# Patient Record
Sex: MALE | Race: WHITE | NOT HISPANIC OR LATINO | URBAN - METROPOLITAN AREA
[De-identification: names, ages, dates, MRNs, and addresses within clinical notes are randomized per-mention and may not be internally consistent; named-entity substitution may affect disease eponyms.]

---

## 2018-05-23 ENCOUNTER — OFFICE VISIT (OUTPATIENT)
Dept: ORTHOPEDIC SURGERY | Facility: CLINIC | Age: 23
End: 2018-05-23

## 2018-05-23 VITALS
HEART RATE: 94 BPM | BODY MASS INDEX: 28.5 KG/M2 | HEIGHT: 68 IN | DIASTOLIC BLOOD PRESSURE: 107 MMHG | SYSTOLIC BLOOD PRESSURE: 172 MMHG | WEIGHT: 188.05 LBS

## 2018-05-23 DIAGNOSIS — M84.361A STRESS FRACTURE OF RIGHT TIBIA, INITIAL ENCOUNTER: Primary | ICD-10-CM

## 2018-05-23 PROCEDURE — 99204 OFFICE O/P NEW MOD 45 MIN: CPT | Performed by: ORTHOPAEDIC SURGERY

## 2018-05-23 NOTE — PROGRESS NOTES
[facility]    Subjective     Chief Complaint   Patient presents with   • Right Lower Leg - Pain        HPI      Faisal Dougherty is a 22 y.o. male.  He complains of right leg pain.  He started running to train for PT test and started to have increasing right tibia pain.  His pain is 7 out of 10 and throbbing.  He is currently on crutches.  He was told he has a stress fracture at [facility] .        History reviewed. No pertinent past medical history.   Past Surgical History:   Procedure Laterality Date   • HERNIA REPAIR     • NASAL SEPTUM SURGERY        Family History   Problem Relation Age of Onset   • No Known Problems Mother    • No Known Problems Father      Social History     Social History   • Marital status: Single     Spouse name: N/A   • Number of children: N/A   • Years of education: N/A     Occupational History   • Not on file.     Social History Main Topics   • Smoking status: Former Smoker   • Smokeless tobacco: Never Used   • Alcohol use No   • Drug use: No   • Sexual activity: Defer     Other Topics Concern   • Not on file     Social History Narrative   • No narrative on file      No current outpatient prescriptions on file prior to visit.     No current facility-administered medications on file prior to visit.       No Known Allergies     The following portions of the patient's history were reviewed and updated as appropriate: allergies, current medications, past family history, past medical history, past social history, past surgical history and problem list.    Review of Systems   Constitutional: Negative.    HENT: Negative.    Eyes: Negative.    Respiratory: Negative.    Cardiovascular: Negative.    Gastrointestinal: Negative.    Endocrine: Negative.    Genitourinary: Negative.    Musculoskeletal: Positive for arthralgias.   Skin: Negative.    Allergic/Immunologic: Negative.    Neurological: Negative.    Hematological: Negative.    Psychiatric/Behavioral: Negative.      "    Objective      Physical Exam  BP (!) 172/107   Pulse 94   Ht 172.7 cm (67.99\")   Wt 85.3 kg (188 lb 0.8 oz)   BMI 28.60 kg/m²     Body mass index is 28.6 kg/m².        GENERAL APPEARANCE: awake, alert & oriented x 3, in no acute distress and well developed, well nourished  PSYCH: normal mood and affect  LUNGS:  breathing nonlabored, no wheezing  EYES: sclera anicteric, pupils equal  CARDIOVASCULAR: palpable pulses dorsalis pedis, palpable posterior tibial bilaterally. Capillary refill less than 2 seconds  INTEGUMENTARY: skin intact, no clubbing, cyanosis  NEUROLOGIC:  Normal Sensation and reflexes             Ortho Exam  Peripheral Vascular:    Upper Extremity:   Inspection:  Left--no cyanotic nail beds Right--no cyanotic nail beds   Bilateral:  Pink nail beds with brisk capillary refill   Palpation:  Bilateral radial pulse normal  Musculoskeletal:  Global Assessment:  Overall assessment of Lower Extremity Muscle Strength and Tone:  Right quadriceps--5/5  Right hamstrings--5/5  Right tibialis anterior--5/5  Right gastroc soleus--5/5  Right EHL--5/5  Lower Extremity:  Knee/Patella:  No digital clubbing or cyanosis.    Examination of right knee reveals:  Normal deep tendon reflexes, coordination, strength, tone, sensation.  No known fractures or deformities.  Inspection and Palpation:    Right knee:  Tenderness:  Mid tibia shaft tenderness with minimal swelling  Effusion:  none  Crepitus:  none  Pulses:  2+  Ecchymosis:  None  Warmth:  None   ROM:  Right:  Extension:0    Flexion:135  Left:  Extension:0     Flexion:135  Instability:  Right:  Lachman Test:  Negative, Varus stress test negative,   Valgus stress test negative, Posterior Drawer Test:  Negative  Deformities/Malalignments/Discrepancies:    Left:  none  Right:  none  Functional Testing:  Right:  Antelmo's test:  Negative  Patella grind test:  Negative  Q-angle:  Normal  Apprehension Sign:  Negative        Imaging/Studies  Imaging Results (last 7 " days)     ** No results found for the last 168 hours. **        I reviewed the x-rays which show no definitive black line  Assessment/Plan        ICD-10-CM ICD-9-CM   1. Stress fracture of right tibia, initial encounter M84.361A 733.93       Orders Placed This Encounter   Procedures   • MRI Tibia Fibula Right Without Contrast    I'll see him back after the MRI right tibia.  He most likely has a stress fracture.  He Will remain on crutches nonweightbearing.      Medical Decision Making  Management Options : over-the-counter medicine and close treatment of fracture or dislocation  Data/Risk: radiology tests and independent visualization of imaging, lab tests, or EMG/NCV    Mainor Navarrete MD  05/23/18  10:07 AM         EMR Dragon/Transcription disclaimer:  Much of this encounter note is an electronic transcription of spoken language to printed text. Electronic transcription of spoken language may permit erroneous, or at times, nonsensical words or phrases to be inadvertently transcribed. Although I have reviewed the note for such errors, some may still exist.

## 2018-06-04 ENCOUNTER — HOSPITAL ENCOUNTER (OUTPATIENT)
Dept: MRI IMAGING | Facility: HOSPITAL | Age: 23
Discharge: HOME | End: 2018-06-04
Attending: ORTHOPAEDIC SURGERY | Admitting: ORTHOPAEDIC SURGERY

## 2018-06-04 DIAGNOSIS — M84.361A STRESS FRACTURE OF RIGHT TIBIA, INITIAL ENCOUNTER: ICD-10-CM

## 2018-06-04 PROCEDURE — 73718 MRI LOWER EXTREMITY W/O DYE: CPT

## 2018-06-06 ENCOUNTER — TELEPHONE (OUTPATIENT)
Dept: ORTHOPEDIC SURGERY | Facility: CLINIC | Age: 23
End: 2018-06-06

## 2018-06-06 ENCOUNTER — OFFICE VISIT (OUTPATIENT)
Dept: ORTHOPEDIC SURGERY | Facility: CLINIC | Age: 23
End: 2018-06-06

## 2018-06-06 VITALS — BODY MASS INDEX: 28.5 KG/M2 | HEIGHT: 68 IN | OXYGEN SATURATION: 98 % | WEIGHT: 188.05 LBS | HEART RATE: 70 BPM

## 2018-06-06 DIAGNOSIS — M84.361G STRESS FRACTURE OF RIGHT TIBIA WITH DELAYED HEALING, SUBSEQUENT ENCOUNTER: Primary | ICD-10-CM

## 2018-06-06 PROCEDURE — 99213 OFFICE O/P EST LOW 20 MIN: CPT | Performed by: ORTHOPAEDIC SURGERY

## 2018-06-06 NOTE — PROGRESS NOTES
"    [facility] [facility]    Subjective     Chief Complaint   Patient presents with   • Right Tibia - Follow-up     After MRI 06/04/2018        HPI      Faisal Dougherty is a 22 y.o. male.  He is follow-up MRI of the right tibia to rule out stress fracture.  His pain is the same.  It is 8 out of 10.  It is throbbing and shooting.        History reviewed. No pertinent past medical history.   Past Surgical History:   Procedure Laterality Date   • HERNIA REPAIR     • NASAL SEPTUM SURGERY        Family History   Problem Relation Age of Onset   • No Known Problems Mother    • No Known Problems Father      Social History     Social History   • Marital status: Single     Spouse name: N/A   • Number of children: N/A   • Years of education: N/A     Occupational History   • Not on file.     Social History Main Topics   • Smoking status: Former Smoker   • Smokeless tobacco: Never Used   • Alcohol use No   • Drug use: No   • Sexual activity: Defer     Other Topics Concern   • Not on file     Social History Narrative   • No narrative on file      No current outpatient prescriptions on file prior to visit.     No current facility-administered medications on file prior to visit.       No Known Allergies     The following portions of the patient's history were reviewed and updated as appropriate: allergies, current medications, past family history, past medical history, past social history, past surgical history and problem list.    Review of Systems   Constitutional: Negative.    HENT: Negative.    Eyes: Negative.    Respiratory: Negative.    Cardiovascular: Negative.    Gastrointestinal: Negative.    Endocrine: Negative.    Genitourinary: Negative.    Musculoskeletal: Positive for arthralgias.   Skin: Negative.    Allergic/Immunologic: Negative.    Neurological: Negative.    Hematological: Negative.    Psychiatric/Behavioral: Negative.         Objective      Physical Exam  Pulse 70   Ht 172.7 cm (67.99\")   Wt 85.3 " kg (188 lb 0.8 oz)   SpO2 98%   BMI 28.60 kg/m²     Body mass index is 28.6 kg/m².        GENERAL APPEARANCE: awake, alert & oriented x 3, in no acute distress and well developed, well nourished  PSYCH: normal mood and affect    Ortho Exam  Tender right tibia shaft.  With full motion and normal strength.    Imaging/Studies  Imaging Results (last 7 days)     ** No results found for the last 168 hours. **      I reviewed the MRI which shows a right tibia stress fracture    Assessment/Plan        ICD-10-CM ICD-9-CM   1. Stress fracture of right tibia with delayed healing, subsequent encounter M84.361G V54.89       The plan will be crutches for a minimum of 6 weeks.  He has been on crutches for 3 weeks.  He will follow-up in 3 weeks.  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    Medical Decision Making  Management Options : over-the-counter medicine and close treatment of fracture or dislocation  Data/Risk: radiology tests and independent visualization of imaging, lab tests, or EMG/NCV    Mainor Navarrete MD  06/06/18  10:43 AM         EMR Dragon/Transcription disclaimer:  Much of this encounter note is an electronic transcription of spoken language to printed text. Electronic transcription of spoken language may permit erroneous, or at times, nonsensical words or phrases to be inadvertently transcribed. Although I have reviewed the note for such errors, some may still exist.

## 2018-06-06 NOTE — TELEPHONE ENCOUNTER
Foreign is this something you take care of?        ----- Message from Faisal Dougherty sent at 6/6/2018 12:12 PM EDT -----  Regarding: Test Results Question  Contact: [phone]  Can you attach test results and state the injury for the ? I am in [employer] and they need detailed information on what’s going on

## 2018-06-07 NOTE — TELEPHONE ENCOUNTER
I don't do anything with test results.  Sounds like he needs to have some kind of Disability paperwork filled out, in which case I could fill those out and have Dr. Navarrete sign them.  If he is just wanting an office visit notes and test results then he will need to fill out a medical records release and the  would take care of getting those sent to him.

## 2018-06-07 NOTE — TELEPHONE ENCOUNTER
Spoke to patient and he stated he did not have any form he needed completed. I informed him to come in and sign a medical release form to get test results and office notes for the . He seem to understand this.

## 2018-06-27 ENCOUNTER — OFFICE VISIT (OUTPATIENT)
Dept: ORTHOPEDIC SURGERY | Facility: CLINIC | Age: 23
End: 2018-06-27

## 2018-06-27 VITALS — WEIGHT: 182.1 LBS | BODY MASS INDEX: 27.6 KG/M2 | HEART RATE: 96 BPM | OXYGEN SATURATION: 98 % | HEIGHT: 68 IN

## 2018-06-27 DIAGNOSIS — M84.361G STRESS FRACTURE OF RIGHT TIBIA WITH DELAYED HEALING, SUBSEQUENT ENCOUNTER: Primary | ICD-10-CM

## 2018-06-27 PROCEDURE — 99212 OFFICE O/P EST SF 10 MIN: CPT | Performed by: ORTHOPAEDIC SURGERY

## 2018-06-27 NOTE — PROGRESS NOTES
[facility] Note    Subjective     Chief Complaint   Patient presents with   • Follow-up     3 week follow up -- Stress fracture of right tibia with delayed healing        HPI      Faisal Dougherty is a 22 y.o. male.  He is follow-up right tibia stress fracture.  He's had this for 8 weeks.  He's been on crutches for 6 weeks.  He feels better.  But after walks of more than a short distance he starts to have pain.  He was not supposed be walking any way.         History reviewed. No pertinent past medical history.   Past Surgical History:   Procedure Laterality Date   • HERNIA REPAIR     • NASAL SEPTUM SURGERY        Family History   Problem Relation Age of Onset   • No Known Problems Mother    • No Known Problems Father      Social History     Social History   • Marital status: Single     Spouse name: N/A   • Number of children: N/A   • Years of education: N/A     Occupational History   • Not on file.     Social History Main Topics   • Smoking status: Former Smoker   • Smokeless tobacco: Never Used   • Alcohol use No   • Drug use: No   • Sexual activity: Defer     Other Topics Concern   • Not on file     Social History Narrative   • No narrative on file      No current outpatient prescriptions on file prior to visit.     No current facility-administered medications on file prior to visit.       No Known Allergies     The following portions of the patient's history were reviewed and updated as appropriate: allergies, current medications, past family history, past medical history, past social history, past surgical history and problem list.    Review of Systems   Constitutional: Negative.    HENT: Negative.    Eyes: Negative.    Respiratory: Negative.    Cardiovascular: Negative.    Gastrointestinal: Negative.    Endocrine: Negative.    Genitourinary: Negative.    Musculoskeletal: Positive for arthralgias.   Skin: Negative.    Allergic/Immunologic: Negative.    Neurological: Negative.   "  Hematological: Negative.    Psychiatric/Behavioral: Negative.         Objective      Physical Exam  Pulse 96   Ht 172.7 cm (67.99\")   Wt 82.6 kg (182 lb 1.6 oz)   SpO2 98%   BMI 27.69 kg/m²     Body mass index is 27.69 kg/m².        GENERAL APPEARANCE: awake, alert & oriented x 3, in no acute distress and well developed, well nourished  PSYCH: normal mood and affect      Ortho Exam  He has minimal right tibia tenderness.  He is neurovascular intact with full motion.      Assessment/Plan        ICD-10-CM ICD-9-CM   1. Stress fracture of right tibia with delayed healing, subsequent encounter M84.361G V54.89     He understands the fracture is not completely healed yet.  Pain is how we can  clinical healing.  He has to work or he will lose his job.  We will get him a long-leg Aircast.  He may return to work full duty July 2.  He is at increased risk of fracture.  He understands this but he feels he has no option.  He will follow-up in 3 weeks.  Medical Decision Making  Management Options : over-the-counter medicine and close treatment of fracture or dislocation      Mainor Navarrete MD  06/27/18  11:47 AM         EMR Dragon/Transcription disclaimer:  Much of this encounter note is an electronic transcription of spoken language to printed text. Electronic transcription of spoken language may permit erroneous, or at times, nonsensical words or phrases to be inadvertently transcribed. Although I have reviewed the note for such errors, some may still exist.      "

## 2018-06-28 ENCOUNTER — TELEPHONE (OUTPATIENT)
Dept: ORTHOPEDIC SURGERY | Facility: CLINIC | Age: 23
End: 2018-06-28

## 2018-06-28 NOTE — TELEPHONE ENCOUNTER
----- Message from Faisal Rodriguezgayle sent at 6/28/2018 12:04 PM EDT -----  Regarding: Visit Follow-Up Question  Contact: [phone]    Could you type up A note for work, stating I may return to work with NO RISTRICTIONS please. There picky about the paper work and the other note wasn’t good enough. Thank you

## 2018-06-29 NOTE — TELEPHONE ENCOUNTER
Can you please type up a Return to work note for this patient.  He can return with No Restrictions.  [place]

## 2018-07-18 ENCOUNTER — OFFICE VISIT (OUTPATIENT)
Dept: ORTHOPEDIC SURGERY | Facility: CLINIC | Age: 23
End: 2018-07-18

## 2018-07-18 VITALS — HEIGHT: 68 IN | WEIGHT: 182.1 LBS | HEART RATE: 83 BPM | BODY MASS INDEX: 27.6 KG/M2 | OXYGEN SATURATION: 99 %

## 2018-07-18 DIAGNOSIS — M84.361G STRESS FRACTURE OF RIGHT TIBIA WITH DELAYED HEALING, SUBSEQUENT ENCOUNTER: Primary | ICD-10-CM

## 2018-07-18 PROCEDURE — 99213 OFFICE O/P EST LOW 20 MIN: CPT | Performed by: ORTHOPAEDIC SURGERY

## 2018-07-18 NOTE — PROGRESS NOTES
"    [facility]    Subjective     CC: Follow-up of the Right Tibia (3 weeks)      HPI    Faisal Dougherty is a 22 y.o. male. He is follow-up right tibia stress fracture from 2 weeks ago.  He is doing better.  His pain is 3 out of 10 and shooting.  He is in an Aircast.      ROS:    Constiutional:Pt denies fever, chills, nausea, or vomiting.  MSK:as above    Objective      Past Medical History  No past medical history on file.      Physical Exam  Pulse 83   Ht 172.7 cm (67.99\")   Wt 82.6 kg (182 lb 1.6 oz)   SpO2 99%   BMI 27.69 kg/m²     Body mass index is 27.69 kg/m².    Patient is well nourished and well developed.        Ortho Exam  Peripheral Vascular:    Upper Extremity:   Inspection:  Left--no cyanotic nail beds Right--no cyanotic nail beds   Bilateral:  Pink nail beds with brisk capillary refill   Palpation:  Bilateral radial pulse normal  Musculoskeletal:  Global Assessment:  Overall assessment of Lower Extremity Muscle Strength and Tone:  Right quadriceps--5/5  Right hamstrings--5/5  Right tibialis anterior--5/5  Right gastroc soleus--5/5  Right EHL--5/5  Lower Extremity:  Knee/Patella:  No digital clubbing or cyanosis.    Examination of right knee reveals:  Normal deep tendon reflexes, coordination, strength, tone, sensation.  No known fractures or deformities.  Inspection and Palpation:    Right knee:  Tenderness:  none  Effusion:  none  Crepitus:  none  Pulses:  2+  Ecchymosis:  None  Warmth:  None   ROM:  Right:  Extension:0    Flexion:135  Left:  Extension:0     Flexion:135  Instability:  Right:  Lachman Test:  Negative, Varus stress test negative,   Valgus stress test negative, Posterior Drawer Test:  Negative  Deformities/Malalignments/Discrepancies:    Left:  none  Right:  none  Functional Testing:  Right:  Antelmo's test:  Negative  Patella grind test:  Negative  Q-angle:  Normal  Apprehension Sign:  Negative        Imaging/Labs/EMG Reviewed:  Imaging Results (last 24 " hours)     Procedure Component Value Units Date/Time    XR Tibia Fibula 2 View Right [[id number]] Resulted:  07/18/18 1111     Updated:  07/18/18 1111    Narrative:       Right Tib-Fib X-Ray  Indication: Pain  AP and Lateral views    Findings:  No visible fracture  No bony lesion  Normal soft tissues  Normal joint spaces    He had a tibia stress fracture on previous MRI.              Assessment:  1. Stress fracture of right tibia with delayed healing, subsequent encounter        Plan:  1. Recommend over the counter anti-inflammatories for pain and/or swelling  2. His restriction is no running.  He will follow-up in one month.  He will not need any more x-rays unless he feels worse.  He is clinically doing better.      Medical Decision Making  Management Options : over-the-counter medicine  Data/Risk: radiology tests and independent visualization of imaging, lab tests, or EMG/NCV    Mainor Navarrete MD  07/18/18  11:12 AM

## 2018-07-24 ENCOUNTER — TELEPHONE (OUTPATIENT)
Dept: ORTHOPEDIC SURGERY | Facility: CLINIC | Age: 23
End: 2018-07-24

## 2018-07-24 NOTE — TELEPHONE ENCOUNTER
----- Message from Faisla Dougherty sent at 7/24/2018  2:16 PM EDT -----  Regarding: Visit Follow-Up Question  Contact: [phone]  The  needs doctors notes, for June and July visits. Also need your judgement to see if I need to attend annual  training coming up. If possible send via email or on here   [email or web address]  Thanks

## 2018-07-25 NOTE — TELEPHONE ENCOUNTER
Informed patient he will need to stop by the office to sign a release form to get all his office notes. He understood and will come by next week.

## 2018-08-15 ENCOUNTER — OFFICE VISIT (OUTPATIENT)
Dept: ORTHOPEDIC SURGERY | Facility: CLINIC | Age: 23
End: 2018-08-15

## 2018-08-15 VITALS — WEIGHT: 179.9 LBS | OXYGEN SATURATION: 99 % | BODY MASS INDEX: 27.26 KG/M2 | HEIGHT: 68 IN | HEART RATE: 101 BPM

## 2018-08-15 DIAGNOSIS — M84.361G STRESS FRACTURE OF RIGHT TIBIA WITH DELAYED HEALING, SUBSEQUENT ENCOUNTER: Primary | ICD-10-CM

## 2018-08-15 PROCEDURE — 99212 OFFICE O/P EST SF 10 MIN: CPT | Performed by: ORTHOPAEDIC SURGERY

## 2018-08-15 RX ORDER — PRAZOSIN HYDROCHLORIDE 1 MG/1
CAPSULE ORAL
COMMUNITY
Start: 2018-08-14 | End: 2022-05-02

## 2018-08-15 NOTE — PROGRESS NOTES
"    [facility] [place] [facility] Note    Subjective     Chief Complaint   Patient presents with   • Right Tibia - Follow-up     4 weeks        HPI      Faisal Dougherty is a 23 y.o. male.  He is follow-up right tibia stress fracture.  He's been treated for 12 weeks.  He's doing better.  No pain.        History reviewed. No pertinent past medical history.   Past Surgical History:   Procedure Laterality Date   • HERNIA REPAIR     • NASAL SEPTUM SURGERY        Family History   Problem Relation Age of Onset   • No Known Problems Mother    • No Known Problems Father      Social History     Social History   • Marital status: Single     Spouse name: N/A   • Number of children: N/A   • Years of education: N/A     Occupational History   • Not on file.     Social History Main Topics   • Smoking status: Former Smoker   • Smokeless tobacco: Never Used   • Alcohol use No   • Drug use: No   • Sexual activity: Defer     Other Topics Concern   • Not on file     Social History Narrative   • No narrative on file      No current outpatient prescriptions on file prior to visit.     No current facility-administered medications on file prior to visit.       No Known Allergies     The following portions of the patient's history were reviewed and updated as appropriate: allergies, current medications, past family history, past medical history, past social history, past surgical history and problem list.    Review of Systems   Constitutional: Negative.    HENT: Negative.    Eyes: Negative.    Respiratory: Negative.    Cardiovascular: Negative.    Gastrointestinal: Negative.    Endocrine: Negative.    Genitourinary: Negative.    Musculoskeletal: Positive for arthralgias.   Skin: Negative.    Allergic/Immunologic: Negative.    Neurological: Negative.    Hematological: Negative.    Psychiatric/Behavioral: Negative.         Objective      Physical Exam  Pulse 101   Ht 172.7 cm (67.99\")   Wt 81.6 kg (179 lb 14.3 oz)   SpO2 99%   BMI 27.36 " kg/m²     Body mass index is 27.36 kg/m².        GENERAL APPEARANCE: awake, alert & oriented x 3, in no acute distress and well developed, well nourished  PSYCH: normal mood and affect      Ortho Exam  Peripheral Vascular:    Upper Extremity:   Inspection:  Left--no cyanotic nail beds Right--no cyanotic nail beds   Bilateral:  Pink nail beds with brisk capillary refill   Palpation:  Bilateral radial pulse normal  Musculoskeletal:  Global Assessment:  Overall assessment of Lower Extremity Muscle Strength and Tone:  Right quadriceps--5/5  Right hamstrings--5/5  Right tibialis anterior--5/5  Right gastroc soleus--5/5  Right EHL--5/5  Lower Extremity:  Knee/Patella:  No digital clubbing or cyanosis.    Examination of right knee reveals:  Normal deep tendon reflexes, coordination, strength, tone, sensation.  No known fractures or deformities.  Inspection and Palpation:    Right knee:  Tenderness:  none  Effusion:  none  Crepitus:  none  Pulses:  2+  Ecchymosis:  None  Warmth:  None   ROM:  Right:  Extension:0    Flexion:135  Left:  Extension:0     Flexion:135  Instability:  Right:  Lachman Test:  Negative, Varus stress test negative,   Valgus stress test negative, Posterior Drawer Test:  Negative  Deformities/Malalignments/Discrepancies:    Left:  none  Right:  none  Functional Testing:  Right:  Antelmo's test:  Negative  Patella grind test:  Negative  Q-angle:  Normal  Apprehension Sign:  Negative        Imaging/Studies  Imaging Results (last 7 days)     ** No results found for the last 168 hours. **          Assessment/Plan        ICD-10-CM ICD-9-CM   1. Stress fracture of right tibia with delayed healing, subsequent encounter M84.361G V54.89     He may advance activity as tolerated.  No running.  He'll follow-up in 6 weeks.  Medical Decision Making  Management Options : over-the-counter medicine      Mainor Navarrete MD  08/15/18  3:06 PM         EMR Dragon/Transcription disclaimer:  Much of this encounter note is  an electronic transcription of spoken language to printed text. Electronic transcription of spoken language may permit erroneous, or at times, nonsensical words or phrases to be inadvertently transcribed. Although I have reviewed the note for such errors, some may still exist.

## 2018-09-26 ENCOUNTER — OFFICE VISIT (OUTPATIENT)
Dept: ORTHOPEDIC SURGERY | Facility: CLINIC | Age: 23
End: 2018-09-26

## 2018-09-26 VITALS — OXYGEN SATURATION: 98 % | WEIGHT: 176.37 LBS | BODY MASS INDEX: 26.73 KG/M2 | HEIGHT: 68 IN | HEART RATE: 99 BPM

## 2018-09-26 DIAGNOSIS — M84.361G STRESS FRACTURE OF RIGHT TIBIA WITH DELAYED HEALING, SUBSEQUENT ENCOUNTER: Primary | ICD-10-CM

## 2018-09-26 PROCEDURE — 99212 OFFICE O/P EST SF 10 MIN: CPT | Performed by: ORTHOPAEDIC SURGERY

## 2018-09-26 NOTE — PROGRESS NOTES
[facility] [place] [facility] Note    Subjective     Chief Complaint   Patient presents with   • Right Tibia - Follow-up     6 week        HPI      Faisal Dougherty is a 23 y.o. male.  He feels better.  He is 12 weeks out with his right tibia stress fracture.  He is in [employer].  He has not been running.  He has some pain after he is on the bike for an extended amount of time.        History reviewed. No pertinent past medical history.   Past Surgical History:   Procedure Laterality Date   • HERNIA REPAIR     • NASAL SEPTUM SURGERY        Family History   Problem Relation Age of Onset   • No Known Problems Mother    • No Known Problems Father      Social History     Social History   • Marital status: Single     Spouse name: N/A   • Number of children: N/A   • Years of education: N/A     Occupational History   • Not on file.     Social History Main Topics   • Smoking status: Former Smoker   • Smokeless tobacco: Never Used   • Alcohol use No   • Drug use: No   • Sexual activity: Defer     Other Topics Concern   • Not on file     Social History Narrative   • No narrative on file      Current Outpatient Prescriptions on File Prior to Visit   Medication Sig Dispense Refill   • prazosin (MINIPRESS) 1 MG capsule        No current facility-administered medications on file prior to visit.       No Known Allergies     The following portions of the patient's history were reviewed and updated as appropriate: allergies, current medications, past family history, past medical history, past social history, past surgical history and problem list.    Review of Systems   Constitutional: Negative.    HENT: Negative.    Eyes: Negative.    Respiratory: Negative.    Cardiovascular: Negative.    Gastrointestinal: Negative.    Endocrine: Negative.    Genitourinary: Negative.    Musculoskeletal: Positive for arthralgias.   Skin: Negative.    Allergic/Immunologic: Negative.    Neurological: Negative.    Hematological: Negative.   "  Psychiatric/Behavioral: Negative.         Objective      Physical Exam  Pulse 99   Ht 172.7 cm (67.99\")   Wt 80 kg (176 lb 5.9 oz)   SpO2 98%   BMI 26.82 kg/m²     Body mass index is 26.82 kg/m².        GENERAL APPEARANCE: awake, alert & oriented x 3, in no acute distress and well developed, well nourished  PSYCH: normal mood and affect    Ortho Exam  Peripheral Vascular:    Upper Extremity:   Inspection:  Left--no cyanotic nail beds Right--no cyanotic nail beds   Bilateral:  Pink nail beds with brisk capillary refill   Palpation:  Bilateral radial pulse normal  Musculoskeletal:  Global Assessment:  Overall assessment of Lower Extremity Muscle Strength and Tone:  Right quadriceps--5/5  Right hamstrings--5/5  Right tibialis anterior--5/5  Right gastroc soleus--5/5  Right EHL--5/5  Lower Extremity:  Knee/Patella:  No digital clubbing or cyanosis.    Examination of right knee reveals:  Normal deep tendon reflexes, coordination, strength, tone, sensation.  No known fractures or deformities.  Inspection and Palpation:    Right knee:  Tenderness:  none  Effusion:  none  Crepitus:  none  Pulses:  2+  Ecchymosis:  None  Warmth:  None   ROM:  Right:  Extension:0    Flexion:135  Left:  Extension:0     Flexion:135  Instability:  Right:  Lachman Test:  Negative, Varus stress test negative,   Valgus stress test negative, Posterior Drawer Test:  Negative  Deformities/Malalignments/Discrepancies:    Left:  none  Right:  none  Functional Testing:  Right:  Antelmo's test:  Negative  Patella grind test:  Negative  Q-angle:  Normal  Apprehension Sign:  Negative        Imaging/Studies  Imaging Results (last 7 days)     ** No results found for the last 168 hours. **          Assessment/Plan        ICD-10-CM ICD-9-CM   1. Stress fracture of right tibia with delayed healing, subsequent encounter M84.361G V54.89     I recommend no running yet.  I'll see him back in a month.  He may continue the exercise bike.  Medical Decision " Making  Management Options : over-the-counter medicine      Mainor Navarrete MD  09/26/18  3:17 PM         EMR Dragon/Transcription disclaimer:  Much of this encounter note is an electronic transcription of spoken language to printed text. Electronic transcription of spoken language may permit erroneous, or at times, nonsensical words or phrases to be inadvertently transcribed. Although I have reviewed the note for such errors, some may still exist.

## 2018-10-29 ENCOUNTER — OFFICE VISIT (OUTPATIENT)
Dept: ORTHOPEDIC SURGERY | Facility: CLINIC | Age: 23
End: 2018-10-29

## 2018-10-29 VITALS — HEART RATE: 112 BPM | WEIGHT: 191 LBS | BODY MASS INDEX: 28.95 KG/M2 | HEIGHT: 68 IN | OXYGEN SATURATION: 98 %

## 2018-10-29 DIAGNOSIS — Z09 FRACTURE FOLLOW-UP: Primary | ICD-10-CM

## 2018-10-29 DIAGNOSIS — M84.361G STRESS FRACTURE OF RIGHT TIBIA WITH DELAYED HEALING, SUBSEQUENT ENCOUNTER: ICD-10-CM

## 2018-10-29 PROCEDURE — 99212 OFFICE O/P EST SF 10 MIN: CPT | Performed by: ORTHOPAEDIC SURGERY

## 2018-10-29 RX ORDER — MIRTAZAPINE 15 MG/1
TABLET, FILM COATED ORAL
COMMUNITY
Start: 2018-10-16 | End: 2022-05-02

## 2018-10-29 NOTE — PROGRESS NOTES
[facility] [place] [facility] Note    Subjective     Chief Complaint   Patient presents with   • Right Tibia - Follow-up     Stress Fracture of Right Tibia   1 month f/u        HPI      Faisal Dougherty is a 23 y.o. male.  He is doing better 16 weeks out from his right tibia fracture and then started to do more if she does walking.  He started to have pain.  He has not tried running.  He's able to work and walk.        History reviewed. No pertinent past medical history.   Past Surgical History:   Procedure Laterality Date   • HERNIA REPAIR     • NASAL SEPTUM SURGERY        Family History   Problem Relation Age of Onset   • No Known Problems Mother    • No Known Problems Father      Social History     Social History   • Marital status: Single     Spouse name: N/A   • Number of children: N/A   • Years of education: N/A     Occupational History   • Not on file.     Social History Main Topics   • Smoking status: Former Smoker   • Smokeless tobacco: Never Used   • Alcohol use No   • Drug use: No   • Sexual activity: Defer     Other Topics Concern   • Not on file     Social History Narrative   • No narrative on file      Current Outpatient Prescriptions on File Prior to Visit   Medication Sig Dispense Refill   • prazosin (MINIPRESS) 1 MG capsule        No current facility-administered medications on file prior to visit.       No Known Allergies     The following portions of the patient's history were reviewed and updated as appropriate: allergies, current medications, past family history, past medical history, past social history, past surgical history and problem list.    Review of Systems   Constitutional: Negative.    HENT: Negative.    Eyes: Negative.    Respiratory: Negative.    Cardiovascular: Negative.    Gastrointestinal: Negative.    Endocrine: Negative.    Genitourinary: Negative.    Musculoskeletal: Positive for joint swelling.   Skin: Negative.    Allergic/Immunologic: Negative.    Neurological: Negative.   "  Hematological: Negative.    Psychiatric/Behavioral: Negative.         Objective      Physical Exam  Pulse 112   Ht 172.7 cm (67.99\")   Wt 86.6 kg (191 lb)   SpO2 98%   BMI 29.05 kg/m²     Body mass index is 29.05 kg/m².        GENERAL APPEARANCE: awake, alert & oriented x 3, in no acute distress and well developed, well nourished  PSYCH: normal mood and affect      Ortho Exam  Peripheral Vascular:    Upper Extremity:   Inspection:  Left--no cyanotic nail beds Right--no cyanotic nail beds   Bilateral:  Pink nail beds with brisk capillary refill   Palpation:  Bilateral radial pulse normal  Musculoskeletal:  Global Assessment:  Overall assessment of Lower Extremity Muscle Strength and Tone:  Right quadriceps--5/5  Right hamstrings--5/5  Right tibialis anterior--5/5  Right gastroc soleus--5/5  Right EHL--5/5  Lower Extremity:  Knee/Patella:  No digital clubbing or cyanosis.    Examination of right knee reveals:  Normal deep tendon reflexes, coordination, strength, tone, sensation.  No known fractures or deformities.  Inspection and Palpation:    Right knee:  Tenderness:  Anterior middle tibia  Effusion:  none  Crepitus:  none  Pulses:  2+  Ecchymosis:  None  Warmth:  None   ROM:  Right:  Extension:0    Flexion:135  Left:  Extension:0     Flexion:135  Instability:  Right:  Lachman Test:  Negative, Varus stress test negative,   Valgus stress test negative, Posterior Drawer Test:  Negative  Deformities/Malalignments/Discrepancies:    Left:  none  Right:  none  Functional Testing:  Right:  Antelmo's test:  Negative  Patella grind test:  Negative  Q-angle:  Normal  Apprehension Sign:  Negative        Imaging/Studies  Imaging Results (last 7 days)     Procedure Component Value Units Date/Time    XR Tibia Fibula 2 View Right [[id number]] Resulted:  10/29/18 1639     Updated:  10/29/18 1640    Narrative:       Right Tib-Fib X-Ray  Indication: Pain  AP and Lateral views    Findings:  No fracture  No bony lesion  Normal " soft tissues  Normal joint spaces    No prior studies were available for comparison.              Assessment/Plan        ICD-10-CM ICD-9-CM   1. Fracture follow-up Z09 V67.4   2. Stress fracture of right tibia with delayed healing, subsequent encounter M84.361G V54.89       Orders Placed This Encounter   Procedures   • XR Tibia Fibula 2 View Right    It appears his tibia stress fracture is not completely healed.  He will go back to biking and elliptical no running.  He will wear the Aircast.  He will follow-up in 3 weeks.  He is restricted from running.    Medical Decision Making  Management Options : over-the-counter medicine and close treatment of fracture or dislocation  Data/Risk: radiology tests and independent visualization of imaging, lab tests, or EMG/NCV    Mainor Navarrete MD  10/29/18  4:42 PM         EMR Dragon/Transcription disclaimer:  Much of this encounter note is an electronic transcription of spoken language to printed text. Electronic transcription of spoken language may permit erroneous, or at times, nonsensical words or phrases to be inadvertently transcribed. Although I have reviewed the note for such errors, some may still exist.

## 2018-12-05 ENCOUNTER — OFFICE VISIT (OUTPATIENT)
Dept: ORTHOPEDIC SURGERY | Facility: CLINIC | Age: 23
End: 2018-12-05

## 2018-12-05 VITALS — HEIGHT: 68 IN | WEIGHT: 200.62 LBS | OXYGEN SATURATION: 98 % | BODY MASS INDEX: 30.41 KG/M2 | HEART RATE: 103 BPM

## 2018-12-05 DIAGNOSIS — M84.361G STRESS FRACTURE OF RIGHT TIBIA WITH DELAYED HEALING, SUBSEQUENT ENCOUNTER: Primary | ICD-10-CM

## 2018-12-05 PROCEDURE — 99212 OFFICE O/P EST SF 10 MIN: CPT | Performed by: ORTHOPAEDIC SURGERY

## 2018-12-05 NOTE — PROGRESS NOTES
[facility] [place] [facility] Note    Subjective     Chief Complaint   Patient presents with   • Right Tibia - Follow-up     5 week, Stress fracture of right tibia with delayed healing        HPI      Faisal Dougherty is a 23 y.o. male.  Follow-up stress fracture of his right tibia from 4 months ago.  This started after running.  He is much better.  He was trying to run for the [employer].  As long as he doesn't hurt he has no pain.        History reviewed. No pertinent past medical history.   Past Surgical History:   Procedure Laterality Date   • HERNIA REPAIR     • NASAL SEPTUM SURGERY        Family History   Problem Relation Age of Onset   • No Known Problems Mother    • No Known Problems Father      Social History     Socioeconomic History   • Marital status: Single     Spouse name: Not on file   • Number of children: Not on file   • Years of education: Not on file   • Highest education level: Not on file   Social Needs   • Financial resource strain: Not on file   • Food insecurity - worry: Not on file   • Food insecurity - inability: Not on file   • Transportation needs - medical: Not on file   • Transportation needs - non-medical: Not on file   Occupational History   • Not on file   Tobacco Use   • Smoking status: Former Smoker   • Smokeless tobacco: Never Used   Substance and Sexual Activity   • Alcohol use: No   • Drug use: No   • Sexual activity: Defer   Other Topics Concern   • Not on file   Social History Narrative   • Not on file      Current Outpatient Medications on File Prior to Visit   Medication Sig Dispense Refill   • mirtazapine (REMERON) 15 MG tablet      • prazosin (MINIPRESS) 1 MG capsule        No current facility-administered medications on file prior to visit.       No Known Allergies     The following portions of the patient's history were reviewed and updated as appropriate: allergies, current medications, past family history, past medical history, past social history, past  "surgical history and problem list.    Review of Systems   Constitutional: Negative.    HENT: Negative.    Eyes: Negative.    Respiratory: Negative.    Cardiovascular: Negative.    Gastrointestinal: Negative.    Endocrine: Negative.    Genitourinary: Negative.    Musculoskeletal: Positive for arthralgias.   Skin: Negative.    Allergic/Immunologic: Negative.    Neurological: Negative.    Hematological: Negative.    Psychiatric/Behavioral: Negative.         Objective      Physical Exam  Pulse 103   Ht 172.7 cm (67.99\")   Wt 91 kg (200 lb 9.9 oz)   SpO2 98%   BMI 30.51 kg/m²     Body mass index is 30.51 kg/m².        GENERAL APPEARANCE: awake, alert & oriented x 3, in no acute distress and well developed, well nourished  PSYCH: normal mood and affect    Ortho Exam  Peripheral Vascular:    Upper Extremity:   Inspection:  Left--no cyanotic nail beds Right--no cyanotic nail beds   Bilateral:  Pink nail beds with brisk capillary refill   Palpation:  Bilateral radial pulse normal  Musculoskeletal:  Global Assessment:  Overall assessment of Lower Extremity Muscle Strength and Tone:  Right quadriceps--5/5  Right hamstrings--5/5  Right tibialis anterior--5/5  Right gastroc soleus--5/5  Right EHL--5/5  Lower Extremity:  Knee/Patella:  No digital clubbing or cyanosis.    Examination of right knee reveals:  Normal deep tendon reflexes, coordination, strength, tone, sensation.  No known fractures or deformities.  Inspection and Palpation:    Right knee:  Tenderness:  none  Effusion:  none  Crepitus:  none  Pulses:  2+  Ecchymosis:  None  Warmth:  None   ROM:  Right:  Extension:0    Flexion:135  Left:  Extension:0     Flexion:135  Instability:  Right:  Lachman Test:  Negative, Varus stress test negative,   Valgus stress test negative, Posterior Drawer Test:  Negative  Deformities/Malalignments/Discrepancies:    Left:  none  Right:  none  Functional Testing:  Right:  Antelmo's test:  Negative  Patella grind test:  " Negative  Q-angle:  Normal  Apprehension Sign:  Negative        Imaging/Studies  Imaging Results (last 7 days)     ** No results found for the last 168 hours. **          Assessment/Plan        ICD-10-CM ICD-9-CM   1. Stress fracture of right tibia with delayed healing, subsequent encounter M84.361G V54.89     He may advance activity as tolerated.  I'll see him back as needed.  No running until his pain is 100% gone.  Medical Decision Making  Management Options : over-the-counter medicine      Mainor Navarrete MD  12/05/18  3:43 PM         EMR Dragon/Transcription disclaimer:  Much of this encounter note is an electronic transcription of spoken language to printed text. Electronic transcription of spoken language may permit erroneous, or at times, nonsensical words or phrases to be inadvertently transcribed. Although I have reviewed the note for such errors, some may still exist.

## 2022-05-02 ENCOUNTER — OFFICE VISIT (OUTPATIENT)
Dept: FAMILY MEDICINE CLINIC | Facility: CLINIC | Age: 27
End: 2022-05-02

## 2022-05-02 VITALS
WEIGHT: 187.7 LBS | TEMPERATURE: 97.7 F | HEART RATE: 88 BPM | RESPIRATION RATE: 15 BRPM | DIASTOLIC BLOOD PRESSURE: 86 MMHG | SYSTOLIC BLOOD PRESSURE: 134 MMHG | HEIGHT: 68 IN | BODY MASS INDEX: 28.45 KG/M2 | OXYGEN SATURATION: 98 %

## 2022-05-02 DIAGNOSIS — I10 PRIMARY HYPERTENSION: Primary | ICD-10-CM

## 2022-05-02 DIAGNOSIS — G43.909 MIGRAINE SYNDROME: ICD-10-CM

## 2022-05-02 DIAGNOSIS — Z13.1 SCREENING FOR DIABETES MELLITUS: ICD-10-CM

## 2022-05-02 DIAGNOSIS — F33.42 RECURRENT MAJOR DEPRESSIVE DISORDER, IN FULL REMISSION: ICD-10-CM

## 2022-05-02 DIAGNOSIS — Z00.00 GENERAL MEDICAL EXAM: ICD-10-CM

## 2022-05-02 DIAGNOSIS — Z13.220 SCREENING FOR HYPERLIPIDEMIA: ICD-10-CM

## 2022-05-02 DIAGNOSIS — B00.9 HSV INFECTION: ICD-10-CM

## 2022-05-02 DIAGNOSIS — Z79.899 HIGH RISK MEDICATION USE: ICD-10-CM

## 2022-05-02 DIAGNOSIS — Z11.59 NEED FOR HEPATITIS C SCREENING TEST: ICD-10-CM

## 2022-05-02 PROCEDURE — 99214 OFFICE O/P EST MOD 30 MIN: CPT | Performed by: PHYSICIAN ASSISTANT

## 2022-05-02 RX ORDER — ACYCLOVIR 200 MG/1
CAPSULE ORAL
Qty: 30 CAPSULE | Refills: 0 | OUTPATIENT
Start: 2022-05-02

## 2022-05-02 RX ORDER — LISINOPRIL 20 MG/1
20 TABLET ORAL DAILY
Qty: 90 TABLET | Refills: 1 | Status: SHIPPED | OUTPATIENT
Start: 2022-05-02 | End: 2022-05-02 | Stop reason: SDUPTHER

## 2022-05-02 RX ORDER — VALACYCLOVIR HYDROCHLORIDE 1 G/1
1000 TABLET, FILM COATED ORAL DAILY
Qty: 90 TABLET | Refills: 1 | Status: SHIPPED | OUTPATIENT
Start: 2022-05-02 | End: 2022-10-18

## 2022-05-02 RX ORDER — LISINOPRIL 20 MG/1
20 TABLET ORAL DAILY
Qty: 90 TABLET | Refills: 1 | Status: SHIPPED | OUTPATIENT
Start: 2022-05-02 | End: 2022-10-10

## 2022-05-02 RX ORDER — LISINOPRIL 20 MG/1
20 TABLET ORAL DAILY
COMMUNITY
Start: 2022-04-19 | End: 2022-05-02 | Stop reason: SDUPTHER

## 2022-05-02 RX ORDER — IBUPROFEN 800 MG/1
800 TABLET ORAL EVERY 6 HOURS PRN
Qty: 90 TABLET | Refills: 0 | Status: SHIPPED | OUTPATIENT
Start: 2022-05-02 | End: 2022-05-02 | Stop reason: SDUPTHER

## 2022-05-02 RX ORDER — IBUPROFEN 800 MG/1
800 TABLET ORAL EVERY 6 HOURS PRN
Qty: 90 TABLET | Refills: 0 | Status: SHIPPED | OUTPATIENT
Start: 2022-05-02 | End: 2022-10-18

## 2022-05-02 NOTE — PROGRESS NOTES
Patient Office Visit      Patient Name: Faisal Dougherty  : 1995   MRN: [id number]     Chief Complaint:    Chief Complaint   Patient presents with   • Depression   • migraine disorder   • Hypertension   • genital herpes       History of Present Illness: Faisal Dougherty is a 26 y.o. male who is here today for follow-up for depression.  We had seen him over a month ago for depression.  He was doing very well on the sertraline 50 mg/day however he did run out a few days ago.  He was not having any side effects and this was controlling his anxiety and depression.  He also needs a refill on his blood pressure medication.  His blood pressure reading is good today.  He is asking if he can get a prescription for ibuprofen 800 mg which he takes periodically for migraine headaches and this works well.  He is also asking about getting a refill for acyclovir for genital herpes prophylaxis.  [facility] and he cannot get more refills without being seen.    Subjective      Review of Systems:   Review of Systems   Constitutional: Negative for fatigue.   Respiratory: Negative for shortness of breath.    Cardiovascular: Negative for chest pain, palpitations and leg swelling.   Psychiatric/Behavioral: Negative for dysphoric mood. The patient is not nervous/anxious.         Past Medical History:   Past Medical History:   Diagnosis Date   • Anxiety    • Hypertension    • Mixed hyperlipidemia        Past Surgical History:   Past Surgical History:   Procedure Laterality Date   • HERNIA REPAIR     • NASAL SEPTUM SURGERY         Family History:   Family History   Problem Relation Age of Onset   • No Known Problems Mother    • Coronary artery disease Father         PREMATURE       Social History:   Social History     Socioeconomic History   • Marital status: Single   Tobacco Use   • Smoking status: Former Smoker     Packs/day: 0.25     Years: 4.00     Pack years: 1.00      "Quit date:      Years since quittin.3   • Smokeless tobacco: Never Used   Substance and Sexual Activity   • Alcohol use: No   • Drug use: No   • Sexual activity: Defer       Allergies:   No Known Allergies    Objective     Physical Exam:  Vital Signs:   Vitals:    22 1418   BP: 134/86   BP Location: Right arm   Patient Position: Sitting   Cuff Size: Adult   Pulse: 88   Resp: 15   Temp: 97.7 °F (36.5 °C)   TempSrc: Temporal   SpO2: 98%   Weight: 85.1 kg (187 lb 11.2 oz)   Height: 172.7 cm (68\")     Body mass index is 28.54 kg/m².          Physical Exam  Constitutional:       General: He is not in acute distress.     Appearance: Normal appearance.   Neurological:      Mental Status: He is alert.   Psychiatric:         Mood and Affect: Mood normal.         Behavior: Behavior normal.         Thought Content: Thought content normal.         Judgment: Judgment normal.         Procedures    Assessment / Plan      Assessment/Plan:   Diagnoses and all orders for this visit:    1. Primary hypertension (Primary)  -     Discontinue: lisinopril (PRINIVIL,ZESTRIL) 20 MG tablet; Take 1 tablet by mouth Daily.  Dispense: 90 tablet; Refill: 1  -     lisinopril (PRINIVIL,ZESTRIL) 20 MG tablet; Take 1 tablet by mouth Daily.  Dispense: 90 tablet; Refill: 1    Stable continue med    2. Recurrent major depressive disorder, in full remission (HCC)  -     Discontinue: sertraline (ZOLOFT) 50 MG tablet; Take 1 tablet by mouth Daily.  Dispense: 90 tablet; Refill: 1  -     sertraline (ZOLOFT) 50 MG tablet; Take 1 tablet by mouth Daily.  Dispense: 90 tablet; Refill: 1    Stable continue med    3. Migraine syndrome    -     ibuprofen (ADVIL,MOTRIN) 800 MG tablet; Take 1 tablet by mouth Every 6 (Six) Hours As Needed for Moderate Pain  or Headache.  Dispense: 90 tablet; Refill: 0    Stable with occasional use of 800 mg ibuprofen over-the-counter.    4. General medical exam  -     CBC & Differential; Future  -     Comprehensive " Metabolic Panel; Future  -     Lipid Panel; Future  -     Hemoglobin A1c; Future  -     Vitamin B12 & Folate; Future  -     Vitamin D 25 Hydroxy; Future  -     Hepatitis C Antibody; Future    At his next visit in 6 months we will do a preventive visit and he will get his labs drawn week prior.    5. Screening for diabetes mellitus  -     Hemoglobin A1c; Future    Labs to be done prior to next visit.    6. Screening for hyperlipidemia  -     Lipid Panel; Future    Labs to be done prior to next visit.    7. Need for hepatitis C screening test  -     Hepatitis C Antibody; Future    Labs to be done prior to next visit.    8. High risk medication use    9. HSV infection  -     valACYclovir (Valtrex) 1000 MG tablet; Take 1 tablet by mouth Daily.  Dispense: 90 tablet; Refill: 1    I recommend valacyclovir instead of acyclovir as this only has to be dosed once per day.  He agreed to start Valtrex 1000 mg daily for genital herpes prophylaxis.       Medications:     Current Outpatient Medications:   •  ibuprofen (ADVIL,MOTRIN) 800 MG tablet, Take 1 tablet by mouth Every 6 (Six) Hours As Needed for Moderate Pain  or Headache., Disp: 90 tablet, Rfl: 0  •  lisinopril (PRINIVIL,ZESTRIL) 20 MG tablet, Take 1 tablet by mouth Daily., Disp: 90 tablet, Rfl: 1  •  sertraline (ZOLOFT) 50 MG tablet, Take 1 tablet by mouth Daily., Disp: 90 tablet, Rfl: 1  •  valACYclovir (Valtrex) 1000 MG tablet, Take 1 tablet by mouth Daily., Disp: 90 tablet, Rfl: 1        Follow Up:   Return in about 6 months (around 11/2/2022) for Annual physical.    Jamee Mitchell PA-C   [facility] [place] [facility]

## 2022-05-02 NOTE — TELEPHONE ENCOUNTER
Rx Refill Note    Requested Prescriptions     Pending Prescriptions Disp Refills   • sertraline (ZOLOFT) 50 MG tablet [Pharmacy Med Name: SERTRALINE HCL 50 MG TABS 50 Tablet] 30 tablet 0     Sig: TAKE ONE-HALF TABLET BY MOUTH DAILY FOR 6 DAYS THEN TAKE 1 TABLET BY MOUTH EVERY DAY   • acyclovir (ZOVIRAX) 200 MG capsule [Pharmacy Med Name: ACYCLOVIR 200 MG CAPS 200 Capsule] 30 capsule 0     Sig: TAKE 1 CAPSULE BY MOUTH EVERY DAY FOR PREVENTION        Last office visit with prescribing clinician: Visit date not found      Next office visit with prescribing clinician: 5/2/2022   Last labs:   Last refill:   Pharmacy (be sure to add in Epic). correct

## 2022-05-18 DIAGNOSIS — I10 PRIMARY HYPERTENSION: ICD-10-CM

## 2022-05-18 RX ORDER — LISINOPRIL 20 MG/1
TABLET ORAL
Qty: 30 TABLET | Refills: 1 | OUTPATIENT
Start: 2022-05-18

## 2022-07-28 DIAGNOSIS — B00.9 HSV INFECTION: ICD-10-CM

## 2022-07-28 DIAGNOSIS — F33.42 RECURRENT MAJOR DEPRESSIVE DISORDER, IN FULL REMISSION: ICD-10-CM

## 2022-08-01 RX ORDER — VALACYCLOVIR HYDROCHLORIDE 1 G/1
1000 TABLET, FILM COATED ORAL 2 TIMES DAILY
OUTPATIENT
Start: 2022-08-01

## 2022-10-07 ENCOUNTER — OFFICE VISIT (OUTPATIENT)
Dept: FAMILY MEDICINE CLINIC | Facility: CLINIC | Age: 27
End: 2022-10-07

## 2022-10-07 VITALS
WEIGHT: 185.6 LBS | HEIGHT: 68 IN | TEMPERATURE: 98.6 F | HEART RATE: 86 BPM | BODY MASS INDEX: 28.13 KG/M2 | DIASTOLIC BLOOD PRESSURE: 80 MMHG | SYSTOLIC BLOOD PRESSURE: 100 MMHG | OXYGEN SATURATION: 98 % | RESPIRATION RATE: 12 BRPM

## 2022-10-07 DIAGNOSIS — I10 PRIMARY HYPERTENSION: ICD-10-CM

## 2022-10-07 DIAGNOSIS — F31.9 BIPOLAR 1 DISORDER: Primary | ICD-10-CM

## 2022-10-07 PROCEDURE — 99215 OFFICE O/P EST HI 40 MIN: CPT | Performed by: PHYSICIAN ASSISTANT

## 2022-10-07 NOTE — ASSESSMENT & PLAN NOTE
Psychological condition is worsening.  Medication changes per orders.  Psychological condition  will be reassessed in 2 weeks.  Gave samples of Vraylar and will send in prescription.  This will give us a little time to seek insurance approval if needed.  We discussed the risk versus benefits and potential side effects.  For now he should continue his sertraline.  Most of today's visit spent supportive counseling.  Medication is not going to be the entire fix, he is going to have to work on some coping skills.  We did discuss that he does not seem to make good choices in women and for now should work on staying single at least for now.

## 2022-10-07 NOTE — PROGRESS NOTES
Patient Office Visit      Patient Name: Faisal Dougherty  : 1995   MRN: [id number]     Chief Complaint:    Chief Complaint   Patient presents with   • Depression     Pt here to discuss depression, states medication he was put on is not helping, he states your anger issues and mood swings       History of Present Illness: Faisal Dougherty is a 27 y.o. male who is here today thinking he may need a different medication.  He has children by 2 different women.  She says the 2 women who are mother's of the children have gotten together and ganged up on him.  He said also he found the woman that he was recently dating was cheating on him.  He says in the past he has been diagnosed with PTSD and anxiety.  He said the Zoloft was helping at first but really no longer helping.  He does have some impulse control problems.  He admits to gambling and spending about $2000 a month on lottery tickets.  He says he does regret spending all that money on a very regular basis.    Subjective      Review of Systems:   Review of Systems   Psychiatric/Behavioral: Positive for agitation, behavioral problems, dysphoric mood and sleep disturbance. Negative for hallucinations, self-injury and suicidal ideas. The patient is nervous/anxious and is hyperactive.         Past Medical History:   Past Medical History:   Diagnosis Date   • Anxiety    • Hypertension    • Mixed hyperlipidemia        Past Surgical History:   Past Surgical History:   Procedure Laterality Date   • HERNIA REPAIR     • NASAL SEPTUM SURGERY         Family History:   Family History   Problem Relation Age of Onset   • No Known Problems Mother    • Coronary artery disease Father         PREMATURE       Social History:   Social History     Socioeconomic History   • Marital status: Single   Tobacco Use   • Smoking status: Former Smoker     Packs/day: 0.25     Years: 4.00     Pack years: 1.00     Quit date:      Years since quittin.7   • Smokeless tobacco: Never Used  "  Substance and Sexual Activity   • Alcohol use: No   • Drug use: No   • Sexual activity: Defer       Allergies:   No Known Allergies    Objective     Physical Exam:  Vital Signs:   Vitals:    10/07/22 1030   BP: 100/80   BP Location: Left arm   Patient Position: Sitting   Cuff Size: Adult   Pulse: 86   Resp: 12   Temp: 98.6 °F (37 °C)   TempSrc: Temporal   SpO2: 98%   Weight: 84.2 kg (185 lb 9.6 oz)   Height: 172.7 cm (68\")   PainSc: 0-No pain     Body mass index is 28.22 kg/m².        Physical Exam  Constitutional:       General: He is not in acute distress.     Appearance: Normal appearance.   Neurological:      Mental Status: He is alert.   Psychiatric:         Attention and Perception: Attention normal.         Mood and Affect: Mood is anxious.         Speech: Speech normal.         Behavior: Behavior normal.         Thought Content: Thought content normal.         Cognition and Memory: Cognition normal.         Judgment: Judgment normal.         Procedures    Assessment / Plan      Assessment/Plan:   Diagnoses and all orders for this visit:    1. Bipolar 1 disorder (HCC) (Primary)  Assessment & Plan:  Psychological condition is worsening.  Medication changes per orders.  Psychological condition  will be reassessed in 2 weeks.  Gave samples of Vraylar and will send in prescription.  This will give us a little time to seek insurance approval if needed.  We discussed the risk versus benefits and potential side effects.  For now he should continue his sertraline.  Most of today's visit spent supportive counseling.  Medication is not going to be the entire fix, he is going to have to work on some coping skills.  We did discuss that he does not seem to make good choices in women and for now should work on staying single at least for now.    Orders:  -     Cariprazine HCl (Vraylar) 1.5 MG capsule capsule; Take 1 capsule by mouth Daily.  Dispense: 30 capsule; Refill: 0  -     Cariprazine HCl (Vraylar) 1.5 MG capsule " capsule; Take 1 capsule by mouth Daily.  Dispense: 14 capsule; Refill: 0         Medications:     Current Outpatient Medications:   •  ibuprofen (ADVIL,MOTRIN) 800 MG tablet, Take 1 tablet by mouth Every 6 (Six) Hours As Needed for Moderate Pain  or Headache., Disp: 90 tablet, Rfl: 0  •  lisinopril (PRINIVIL,ZESTRIL) 20 MG tablet, Take 1 tablet by mouth Daily., Disp: 90 tablet, Rfl: 1  •  sertraline (ZOLOFT) 50 MG tablet, Take 1 tablet by mouth Daily., Disp: 90 tablet, Rfl: 1  •  valACYclovir (Valtrex) 1000 MG tablet, Take 1 tablet by mouth Daily., Disp: 90 tablet, Rfl: 1  •  Cariprazine HCl (Vraylar) 1.5 MG capsule capsule, Take 1 capsule by mouth Daily., Disp: 30 capsule, Rfl: 0  •  Cariprazine HCl (Vraylar) 1.5 MG capsule capsule, Take 1 capsule by mouth Daily., Disp: 14 capsule, Rfl: 0    I spent 40 minutes caring for Faisal on this date of service. This time includes time spent by me in the following activities:preparing for the visit, obtaining and/or reviewing a separately obtained history, performing a medically appropriate examination and/or evaluation , counseling and educating the patient/family/caregiver, ordering medications, tests, or procedures and documenting information in the medical record    Follow Up:   Return in about 2 weeks (around 10/21/2022) for 30 minute med recheck.    Jamee Mitchell PA-C   [facility]

## 2022-10-10 RX ORDER — LISINOPRIL 20 MG/1
20 TABLET ORAL DAILY
Qty: 30 TABLET | Refills: 0 | Status: SHIPPED | OUTPATIENT
Start: 2022-10-10 | End: 2022-10-26 | Stop reason: SDUPTHER

## 2022-10-10 NOTE — TELEPHONE ENCOUNTER
Rx Refill Note    Requested Prescriptions     Pending Prescriptions Disp Refills   • lisinopril (PRINIVIL,ZESTRIL) 20 MG tablet [Pharmacy Med Name: LISINOPRIL 20 MG TABS 20 Tablet] 30 tablet 0     Sig: TAKE 1 TABLET BY MOUTH DAILY.        Last office visit with prescribing clinician: 10/7/2022      Next office visit with prescribing clinician: 10/26/2022   Last labs:   Last refill: 05/02/2022   Pharmacy (be sure to add in Epic). correct

## 2022-10-17 DIAGNOSIS — F33.42 RECURRENT MAJOR DEPRESSIVE DISORDER, IN FULL REMISSION: ICD-10-CM

## 2022-10-17 DIAGNOSIS — B00.9 HSV INFECTION: ICD-10-CM

## 2022-10-17 DIAGNOSIS — G43.909 MIGRAINE SYNDROME: ICD-10-CM

## 2022-10-18 RX ORDER — IBUPROFEN 800 MG/1
800 TABLET ORAL EVERY 8 HOURS PRN
Qty: 30 TABLET | Refills: 0 | Status: SHIPPED | OUTPATIENT
Start: 2022-10-18 | End: 2022-10-26 | Stop reason: SDUPTHER

## 2022-10-18 RX ORDER — VALACYCLOVIR HYDROCHLORIDE 1 G/1
1000 TABLET, FILM COATED ORAL DAILY
Qty: 30 TABLET | Refills: 0 | Status: SHIPPED | OUTPATIENT
Start: 2022-10-18 | End: 2022-10-26 | Stop reason: SDUPTHER

## 2022-10-18 NOTE — TELEPHONE ENCOUNTER
Rx Refill Note    Requested Prescriptions     Pending Prescriptions Disp Refills   • sertraline (ZOLOFT) 50 MG tablet [Pharmacy Med Name: SERTRALINE HCL 50 MG TABS 50 Tablet] 30 tablet 0     Sig: TAKE 1 TABLET BY MOUTH DAILY.   • valACYclovir (VALTREX) 1000 MG tablet [Pharmacy Med Name: VALACYCLOVIR HCL 1 GRAM TAB 1 Tablet] 30 tablet 0     Sig: TAKE 1 TABLET BY MOUTH DAILY.   • ibuprofen (ADVIL,MOTRIN) 800 MG tablet [Pharmacy Med Name: IBUPROFEN 800 MG TABS 800 Tablet] 90 tablet 0     Sig: TAKE 1 TABLET BY MOUTH EVERY 6 (SIX) HOURS AS NEEDED FOR MODERATE PAIN OR HEADACHE.        Last office visit with prescribing clinician: 10/7/2022      Next office visit with prescribing clinician: 10/26/2022   Last labs:   Last refill: needs   Pharmacy (be sure to add in Epic). correct

## 2022-10-26 ENCOUNTER — OFFICE VISIT (OUTPATIENT)
Dept: FAMILY MEDICINE CLINIC | Facility: CLINIC | Age: 27
End: 2022-10-26

## 2022-10-26 VITALS
WEIGHT: 188.9 LBS | HEART RATE: 117 BPM | RESPIRATION RATE: 15 BRPM | DIASTOLIC BLOOD PRESSURE: 86 MMHG | OXYGEN SATURATION: 98 % | TEMPERATURE: 98 F | HEIGHT: 68 IN | BODY MASS INDEX: 28.63 KG/M2 | SYSTOLIC BLOOD PRESSURE: 138 MMHG

## 2022-10-26 DIAGNOSIS — I10 PRIMARY HYPERTENSION: ICD-10-CM

## 2022-10-26 DIAGNOSIS — Z00.00 GENERAL MEDICAL EXAM: Primary | ICD-10-CM

## 2022-10-26 DIAGNOSIS — R55 NEAR SYNCOPE: ICD-10-CM

## 2022-10-26 DIAGNOSIS — Z11.59 NEED FOR HEPATITIS C SCREENING TEST: ICD-10-CM

## 2022-10-26 DIAGNOSIS — Z13.1 SCREENING FOR DIABETES MELLITUS: ICD-10-CM

## 2022-10-26 DIAGNOSIS — Z13.220 SCREENING FOR HYPERLIPIDEMIA: ICD-10-CM

## 2022-10-26 DIAGNOSIS — F31.9 BIPOLAR 1 DISORDER: ICD-10-CM

## 2022-10-26 DIAGNOSIS — B00.9 HSV INFECTION: ICD-10-CM

## 2022-10-26 DIAGNOSIS — G43.909 MIGRAINE SYNDROME: ICD-10-CM

## 2022-10-26 PROCEDURE — 99395 PREV VISIT EST AGE 18-39: CPT | Performed by: PHYSICIAN ASSISTANT

## 2022-10-26 PROCEDURE — 99213 OFFICE O/P EST LOW 20 MIN: CPT | Performed by: PHYSICIAN ASSISTANT

## 2022-10-26 PROCEDURE — 93000 ELECTROCARDIOGRAM COMPLETE: CPT | Performed by: PHYSICIAN ASSISTANT

## 2022-10-26 PROCEDURE — 36415 COLL VENOUS BLD VENIPUNCTURE: CPT | Performed by: PHYSICIAN ASSISTANT

## 2022-10-26 RX ORDER — VALACYCLOVIR HYDROCHLORIDE 1 G/1
1000 TABLET, FILM COATED ORAL DAILY
Qty: 90 TABLET | Refills: 1 | Status: SHIPPED | OUTPATIENT
Start: 2022-10-26

## 2022-10-26 RX ORDER — IBUPROFEN 600 MG/1
600 TABLET ORAL EVERY 8 HOURS PRN
Qty: 90 TABLET | Refills: 0 | Status: SHIPPED | OUTPATIENT
Start: 2022-10-26

## 2022-10-26 RX ORDER — LISINOPRIL 20 MG/1
20 TABLET ORAL DAILY
Qty: 90 TABLET | Refills: 1 | Status: SHIPPED | OUTPATIENT
Start: 2022-10-26

## 2022-10-26 NOTE — PATIENT INSTRUCTIONS
"Healthy Eating  Following a healthy eating pattern may help you to achieve and maintain a healthy body weight, reduce the risk of chronic disease, and live a long and productive life. It is important to follow a healthy eating pattern at an appropriate calorie level for your body. Your nutritional needs should be met primarily through food by choosing a variety of nutrient-rich foods.  What are tips for following this plan?  Reading food labels  Read labels and choose the following:  Reduced or low sodium.  Juices with 100% fruit juice.  Foods with low saturated fats and high polyunsaturated and monounsaturated fats.  Foods with whole grains, such as whole wheat, cracked wheat, brown rice, and wild rice.  Whole grains that are fortified with folic acid. This is recommended for women who are pregnant or who want to become pregnant.  Read labels and avoid the following:  Foods with a lot of added sugars. These include foods that contain brown sugar, corn sweetener, corn syrup, dextrose, fructose, glucose, high-fructose corn syrup, honey, invert sugar, lactose, malt syrup, maltose, molasses, raw sugar, sucrose, trehalose, or turbinado sugar.  Do not eat more than the following amounts of added sugar per day:  6 teaspoons (25 g) for women.  9 teaspoons (38 g) for men.  Foods that contain processed or refined starches and grains.  Refined grain products, such as white flour, degermed cornmeal, white bread, and white rice.  Shopping  Choose nutrient-rich snacks, such as vegetables, whole fruits, and nuts. Avoid high-calorie and high-sugar snacks, such as potato chips, fruit snacks, and candy.  Use oil-based dressings and spreads on foods instead of solid fats such as butter, stick margarine, or cream cheese.  Limit pre-made sauces, mixes, and \"instant\" products such as flavored rice, instant noodles, and ready-made pasta.  Try more plant-protein sources, such as tofu, tempeh, black beans, edamame, lentils, nuts, and " seeds.  Explore eating plans such as the Mediterranean diet or vegetarian diet.  Cooking  Use oil to sauté or stir-padilla foods instead of solid fats such as butter, stick margarine, or lard.  Try baking, boiling, grilling, or broiling instead of frying.  Remove the fatty part of meats before cooking.  Steam vegetables in water or broth.  Meal planning    At meals, imagine dividing your plate into fourths:  One-half of your plate is fruits and vegetables.  One-fourth of your plate is whole grains.  One-fourth of your plate is protein, especially lean meats, poultry, eggs, tofu, beans, or nuts.  Include low-fat dairy as part of your daily diet.     Lifestyle  Choose healthy options in all settings, including home, work, school, restaurants, or stores.  Prepare your food safely:  Wash your hands after handling raw meats.  Keep food preparation surfaces clean by regularly washing with hot, soapy water.  Keep raw meats separate from ready-to-eat foods, such as fruits and vegetables.  , meat, poultry, and eggs to the recommended internal temperature.  Store foods at safe temperatures. In general:  Keep cold foods at 40°F (4.4°C) or below.  Keep hot foods at 140°F (60°C) or above.  Keep your freezer at 0°F (-17.8°C) or below.  Foods are no longer safe to eat when they have been between the temperatures of 40°-140°F (4.4-60°C) for more than 2 hours.  What foods should I eat?  Fruits  Aim to eat 2 cup-equivalents of fresh, canned (in natural juice), or frozen fruits each day. Examples of 1 cup-equivalent of fruit include 1 small apple, 8 large strawberries, 1 cup canned fruit, ½ cup dried fruit, or 1 cup 100% juice.  Vegetables  Aim to eat 2½-3 cup-equivalents of fresh and frozen vegetables each day, including different varieties and colors. Examples of 1 cup-equivalent of vegetables include 2 medium carrots, 2 cups raw, leafy greens, 1 cup chopped vegetable (raw or cooked), or 1 medium baked potato.  Grains  Aim  to eat 6 ounce-equivalents of whole grains each day. Examples of 1 ounce-equivalent of grains include 1 slice of bread, 1 cup ready-to-eat cereal, 3 cups popcorn, or ½ cup cooked rice, pasta, or cereal.  Meats and other proteins  Aim to eat 5-6 ounce-equivalents of protein each day. Examples of 1 ounce-equivalent of protein include 1 egg, 1/2 cup nuts or seeds, or 1 tablespoon (16 g) peanut butter. A cut of meat or fish that is the size of a deck of cards is about 3-4 ounce-equivalents.  Of the protein you eat each week, try to have at least 8 ounces come from seafood. This includes salmon, trout, herring, and anchovies.  Dairy  Aim to eat 3 cup-equivalents of fat-free or low-fat dairy each day. Examples of 1 cup-equivalent of dairy include 1 cup (240 mL) milk, 8 ounces (250 g) yogurt, 1½ ounces (44 g) natural cheese, or 1 cup (240 mL) fortified soy milk.  Fats and oils  Aim for about 5 teaspoons (21 g) per day. Choose monounsaturated fats, such as canola and olive oils, avocados, peanut butter, and most nuts, or polyunsaturated fats, such as sunflower, corn, and soybean oils, walnuts, pine nuts, sesame seeds, sunflower seeds, and flaxseed.  Beverages  Aim for six 8-oz glasses of water per day. Limit coffee to three to five 8-oz cups per day.  Limit caffeinated beverages that have added calories, such as soda and energy drinks.  Limit alcohol intake to no more than 1 drink a day for nonpregnant women and 2 drinks a day for men. One drink equals 12 oz of beer (355 mL), 5 oz of wine (148 mL), or 1½ oz of hard liquor (44 mL).  Seasoning and other foods  Avoid adding excess amounts of salt to your foods. Try flavoring foods with herbs and spices instead of salt.  Avoid adding sugar to foods.  Try using oil-based dressings, sauces, and spreads instead of solid fats.  This information is based on general [place] nutrition guidelines. For more information, visit choosemyplate.gov. Exact amounts may vary based on your  nutrition needs.  Summary  A healthy eating plan may help you to maintain a healthy weight, reduce the risk of chronic diseases, and stay active throughout your life.  Plan your meals. Make sure you eat the right portions of a variety of nutrient-rich foods.  Try baking, boiling, grilling, or broiling instead of frying.  Choose healthy options in all settings, including home, work, school, restaurants, or stores.  This information is not intended to replace advice given to you by your health care provider. Make sure you discuss any questions you have with your health care provider.  Document Revised: 04/01/2019 Document Reviewed: 04/01/2019  Elsevier Patient Education © 2021 [employer]

## 2022-10-26 NOTE — PROGRESS NOTES
Annual Physical-Preventive Visit     Patient Name: Faisal Dougherty  : 1995   MRN: [id number]     Chief Complaint:    Chief Complaint   Patient presents with   • Annual Exam       History of Present Illness: Faisal Dougherty is a 27 y.o. male who is here today for their annual health maintenance and physical.  He says he had an episode yesterday where he nearly passed out at work.  He says the Vraylar is helping his mood.  On a scale of 1-10 with 10 being where he wants to be says he is at about a 7 or an 8 or as his mood had been very low previous.    Subjective      Review of Systems:   Review of Systems   Constitutional: Negative for fatigue.   Respiratory: Negative for shortness of breath.    Cardiovascular: Negative for chest pain, palpitations and leg swelling.        Past History:  Medical History: has a past medical history of Anxiety, Hypertension, and Mixed hyperlipidemia.   Surgical History: has a past surgical history that includes Hernia repair and Nasal septum surgery.   Family History: family history includes Coronary artery disease in his father; Diabetes type II in his maternal grandmother; No Known Problems in his mother.   Social History: reports that he has never smoked. He has never used smokeless tobacco. He reports that he does not drink alcohol and does not use drugs.    Health Maintenance   Topic Date Due   • ANNUAL PHYSICAL  Never done   • HEPATITIS C SCREENING  Never done   • COVID-19 Vaccine (3 - Booster for Pfizer series) 10/21/2021   • LIPID PANEL  2022   • INFLUENZA VACCINE  2022   • TDAP/TD VACCINES (2 - Td or Tdap) 2029   • Pneumococcal Vaccine 0-64  Aged Out        Immunization History   Administered Date(s) Administered   • COVID-19 (PFIZER) PURPLE CAP 2021, 2021   • DTP 1995, 1995, 1996, 10/14/1996   • Hep B, Adolescent or Pediatric 1995, 1995, 1996   • HiB 1995, 1995, 1996, 10/14/1996   •  "Influenza, Unspecified 12/11/2019   • MMR 10/14/1996   • OPV 1995, 1995, 10/14/1996   • Tdap 12/11/2019       Medications:     Current Outpatient Medications:   •  Cariprazine HCl (Vraylar) 1.5 MG capsule capsule, Take 1 capsule by mouth Daily., Disp: 90 capsule, Rfl: 1  •  ibuprofen (ADVIL,MOTRIN) 600 MG tablet, Take 1 tablet by mouth Every 8 (Eight) Hours As Needed for Moderate Pain or Headache., Disp: 90 tablet, Rfl: 0  •  lisinopril (PRINIVIL,ZESTRIL) 20 MG tablet, Take 1 tablet by mouth Daily., Disp: 90 tablet, Rfl: 1  •  sertraline (ZOLOFT) 50 MG tablet, Take 1 tablet by mouth Daily., Disp: 90 tablet, Rfl: 1  •  valACYclovir (VALTREX) 1000 MG tablet, Take 1 tablet by mouth Daily., Disp: 90 tablet, Rfl: 1    Allergies:   No Known Allergies    Depression: PHQ-2 Depression Screening  Little interest or pleasure in doing things?     Feeling down, depressed, or hopeless?     PHQ-2 Total Score        Predictive Model Details   No score data available for Risk of Fall         Objective     Physical Exam:  Vital Signs:   Vitals:    10/26/22 1307 10/26/22 1406   BP: 138/86    BP Location: Right arm    Patient Position: Sitting    Cuff Size: Large Adult    Pulse: 117    Resp: 15    Temp: 98 °F (36.7 °C)    TempSrc: Temporal    SpO2: 98%    Weight: 85.7 kg (188 lb 14.4 oz)    Height: 167.6 cm (66\") 172.7 cm (68\")     Body mass index is 28.72 kg/m².   BMI is >= 30 and <35. (Class 1 Obesity). The following options were offered after discussion;: weight loss educational material (shared in after visit summary)       Physical Exam  Constitutional:       Appearance: He is normal weight.   Cardiovascular:      Rate and Rhythm: Normal rate and regular rhythm.   Pulmonary:      Effort: Pulmonary effort is normal.      Breath sounds: Normal breath sounds.   Neurological:      General: No focal deficit present.   Psychiatric:         Thought Content: Thought content normal.         Judgment: Judgment normal. "           ECG 12 Lead    Date/Time: 10/26/2022 2:26 PM  Performed by: Jamee Mitchell PA  Authorized by: Jamee Mitchell PA   Comparison: not compared with previous ECG   Rhythm: sinus tachycardia  Rate: tachycardic  BPM: 110  Conduction: conduction normal  ST Segments: ST segments normal  T Waves: T waves normal  QRS axis: normal  Other: no other findings    Clinical impression: normal ECG            Assessment / Plan      Assessment/Plan:   Diagnoses and all orders for this visit:    1. General medical exam (Primary)  -     Comprehensive Metabolic Panel  -     CBC & Differential  -     Vitamin B12  -     Folate  -     Lipid Panel  -     Hemoglobin A1c  -     TSH  -     T4, Free  -     CK  -     Hepatitis C Antibody    2. Near syncope  Assessment & Plan:  EKG with sinus tachycardia, rate 110 otherwise normal.  We will check some basic labs and have patient follow-up in 2 weeks.    Orders:  -     Comprehensive Metabolic Panel  -     CBC & Differential  -     TSH  -     T4, Free  -     ECG 12 Lead    3. Need for hepatitis C screening test  -     Hepatitis C Antibody    4. Screening for diabetes mellitus  -     Hemoglobin A1c    5. Screening for hyperlipidemia  -     Lipid Panel    6. Bipolar 1 disorder (HCC)  Assessment & Plan:  Psychological condition is improving with treatment.  Continue current treatment regimen.  Psychological condition  will be reassessed in 2 weeks.    Orders:  -     Cariprazine HCl (Vraylar) 1.5 MG capsule capsule; Take 1 capsule by mouth Daily.  Dispense: 90 capsule; Refill: 1  -     sertraline (ZOLOFT) 50 MG tablet; Take 1 tablet by mouth Daily.  Dispense: 90 tablet; Refill: 1    7. Migraine syndrome  Assessment & Plan:  He takes ibuprofen periodically as needed for migraine headaches and for an old shoulder injury.  He says he did end up taking about every other day so is agreeable to lowering the dose from 800 to 600 mg.      Orders:  -     ibuprofen (ADVIL,MOTRIN) 600 MG tablet; Take 1  tablet by mouth Every 8 (Eight) Hours As Needed for Moderate Pain or Headache.  Dispense: 90 tablet; Refill: 0    8. Primary hypertension  Assessment & Plan:  Hypertension is improving with treatment.  Continue current treatment regimen.  Blood pressure will be reassessed at the next regular appointment.    Orders:  -     lisinopril (PRINIVIL,ZESTRIL) 20 MG tablet; Take 1 tablet by mouth Daily.  Dispense: 90 tablet; Refill: 1    9. HSV infection  Assessment & Plan:  Continue valacyclovir for prophylaxis.    Orders:  -     valACYclovir (VALTREX) 1000 MG tablet; Take 1 tablet by mouth Daily.  Dispense: 90 tablet; Refill: 1         Current Outpatient Medications:   •  Cariprazine HCl (Vraylar) 1.5 MG capsule capsule, Take 1 capsule by mouth Daily., Disp: 90 capsule, Rfl: 1  •  ibuprofen (ADVIL,MOTRIN) 600 MG tablet, Take 1 tablet by mouth Every 8 (Eight) Hours As Needed for Moderate Pain or Headache., Disp: 90 tablet, Rfl: 0  •  lisinopril (PRINIVIL,ZESTRIL) 20 MG tablet, Take 1 tablet by mouth Daily., Disp: 90 tablet, Rfl: 1  •  sertraline (ZOLOFT) 50 MG tablet, Take 1 tablet by mouth Daily., Disp: 90 tablet, Rfl: 1  •  valACYclovir (VALTREX) 1000 MG tablet, Take 1 tablet by mouth Daily., Disp: 90 tablet, Rfl: 1    Follow Up:   Return in about 2 weeks (around 11/9/2022) for 30 minute recheck.    Healthcare Maintenance:   Counseling provided on healthy diet and exercise.  Faisal Dougherty voices understanding and acceptance of this advice.  AVS with preventive healthcare tips printed for patient.     Jamee Mitchell PA-C  [facility]

## 2022-10-26 NOTE — ASSESSMENT & PLAN NOTE
EKG with sinus tachycardia, rate 110 otherwise normal.  We will check some basic labs and have patient follow-up in 2 weeks.

## 2022-10-26 NOTE — ASSESSMENT & PLAN NOTE
He takes ibuprofen periodically as needed for migraine headaches and for an old shoulder injury.  He says he did end up taking about every other day so is agreeable to lowering the dose from 800 to 600 mg.

## 2022-10-27 LAB
ALBUMIN SERPL-MCNC: 4.4 G/DL (ref 4.1–5.2)
ALBUMIN/GLOB SERPL: 1.5 {RATIO} (ref 1.2–2.2)
ALP SERPL-CCNC: 93 IU/L (ref 44–121)
ALT SERPL-CCNC: 21 IU/L (ref 0–44)
AST SERPL-CCNC: 14 IU/L (ref 0–40)
BASOPHILS # BLD AUTO: 0.1 X10E3/UL (ref 0–0.2)
BASOPHILS NFR BLD AUTO: 1 %
BILIRUB SERPL-MCNC: 0.5 MG/DL (ref 0–1.2)
BUN SERPL-MCNC: 8 MG/DL (ref 6–20)
BUN/CREAT SERPL: 9 (ref 9–20)
CALCIUM SERPL-MCNC: 9.2 MG/DL (ref 8.7–10.2)
CHLORIDE SERPL-SCNC: 101 MMOL/L (ref 96–106)
CHOLEST SERPL-MCNC: 211 MG/DL (ref 100–199)
CK SERPL-CCNC: 142 U/L (ref 49–439)
CO2 SERPL-SCNC: 25 MMOL/L (ref 20–29)
CREAT SERPL-MCNC: 0.92 MG/DL (ref 0.76–1.27)
EGFRCR SERPLBLD CKD-EPI 2021: 117 ML/MIN/1.73
EOSINOPHIL # BLD AUTO: 0.1 X10E3/UL (ref 0–0.4)
EOSINOPHIL NFR BLD AUTO: 1 %
ERYTHROCYTE [DISTWIDTH] IN BLOOD BY AUTOMATED COUNT: 12.9 % (ref 11.6–15.4)
FOLATE SERPL-MCNC: 5.8 NG/ML
GLOBULIN SER CALC-MCNC: 2.9 G/DL (ref 1.5–4.5)
GLUCOSE SERPL-MCNC: 87 MG/DL (ref 70–99)
HBA1C MFR BLD: 5.3 % (ref 4.8–5.6)
HCT VFR BLD AUTO: 47 % (ref 37.5–51)
HCV AB S/CO SERPL IA: <0.1 S/CO RATIO (ref 0–0.9)
HDLC SERPL-MCNC: 33 MG/DL
HGB BLD-MCNC: 15.8 G/DL (ref 13–17.7)
IMM GRANULOCYTES # BLD AUTO: 0.1 X10E3/UL (ref 0–0.1)
IMM GRANULOCYTES NFR BLD AUTO: 1 %
LDLC SERPL CALC-MCNC: 135 MG/DL (ref 0–99)
LYMPHOCYTES # BLD AUTO: 2.2 X10E3/UL (ref 0.7–3.1)
LYMPHOCYTES NFR BLD AUTO: 21 %
MCH RBC QN AUTO: 28.5 PG (ref 26.6–33)
MCHC RBC AUTO-ENTMCNC: 33.6 G/DL (ref 31.5–35.7)
MCV RBC AUTO: 85 FL (ref 79–97)
MONOCYTES # BLD AUTO: 0.7 X10E3/UL (ref 0.1–0.9)
MONOCYTES NFR BLD AUTO: 7 %
NEUTROPHILS # BLD AUTO: 7.4 X10E3/UL (ref 1.4–7)
NEUTROPHILS NFR BLD AUTO: 69 %
PLATELET # BLD AUTO: 363 X10E3/UL (ref 150–450)
POTASSIUM SERPL-SCNC: 4.3 MMOL/L (ref 3.5–5.2)
PROT SERPL-MCNC: 7.3 G/DL (ref 6–8.5)
RBC # BLD AUTO: 5.55 X10E6/UL (ref 4.14–5.8)
SODIUM SERPL-SCNC: 140 MMOL/L (ref 134–144)
T4 FREE SERPL-MCNC: 1.1 NG/DL (ref 0.82–1.77)
TRIGL SERPL-MCNC: 241 MG/DL (ref 0–149)
TSH SERPL DL<=0.005 MIU/L-ACNC: 0.9 UIU/ML (ref 0.45–4.5)
VIT B12 SERPL-MCNC: 484 PG/ML (ref 232–1245)
VLDLC SERPL CALC-MCNC: 43 MG/DL (ref 5–40)
WBC # BLD AUTO: 10.5 X10E3/UL (ref 3.4–10.8)

## 2022-10-28 ENCOUNTER — TELEPHONE (OUTPATIENT)
Dept: FAMILY MEDICINE CLINIC | Facility: CLINIC | Age: 27
End: 2022-10-28

## 2022-10-28 NOTE — TELEPHONE ENCOUNTER
----- Message from CL Estrada sent at 10/27/2022  9:43 AM EDT -----  Please let patient know that his labs are all normal except for mildly elevated cholesterol.  No need for cholesterol-lowering medication at this time but work on diet and exercise to lose a few pounds and we will continue to monitor.

## 2022-11-15 ENCOUNTER — OFFICE VISIT (OUTPATIENT)
Dept: FAMILY MEDICINE CLINIC | Facility: CLINIC | Age: 27
End: 2022-11-15

## 2022-11-15 VITALS
DIASTOLIC BLOOD PRESSURE: 76 MMHG | RESPIRATION RATE: 15 BRPM | WEIGHT: 192.8 LBS | OXYGEN SATURATION: 98 % | HEART RATE: 83 BPM | TEMPERATURE: 98 F | HEIGHT: 68 IN | BODY MASS INDEX: 29.22 KG/M2 | SYSTOLIC BLOOD PRESSURE: 128 MMHG

## 2022-11-15 DIAGNOSIS — I10 PRIMARY HYPERTENSION: ICD-10-CM

## 2022-11-15 DIAGNOSIS — B00.9 HSV INFECTION: ICD-10-CM

## 2022-11-15 DIAGNOSIS — E78.00 HYPERCHOLESTEROLEMIA: Primary | ICD-10-CM

## 2022-11-15 DIAGNOSIS — F31.9 BIPOLAR 1 DISORDER: ICD-10-CM

## 2022-11-15 PROCEDURE — 99214 OFFICE O/P EST MOD 30 MIN: CPT | Performed by: PHYSICIAN ASSISTANT

## 2022-11-15 NOTE — ASSESSMENT & PLAN NOTE
Mildly elevated LDL with low HDL.  Work on diet and exercise to lose a few pounds.  Exercise should help raise HDL.  He is not a tobacco user.  No indication at this time to be on cholesterol-lowering medication.

## 2022-11-15 NOTE — PROGRESS NOTES
"      Patient Office Visit      Patient Name: Faisal Dougherty  : 1995   MRN: [id number]     Chief Complaint:    Chief Complaint   Patient presents with   • Depression   • Hypertension   • Hyperlipidemia       History of Present Illness: Faisal Dougherty is a 27 y.o. male who is here today for follow-up on blood pressure and bipolar disorder and to discuss his labs.  He is actually doing well with his mood.    Subjective      Review of Systems:   Review of Systems   Constitutional: Negative for fatigue.   Respiratory: Negative for shortness of breath.    Cardiovascular: Negative for chest pain, palpitations and leg swelling.   Psychiatric/Behavioral: Negative for dysphoric mood. The patient is not nervous/anxious.         Past Medical History:   Past Medical History:   Diagnosis Date   • Anxiety    • Hypertension    • Mixed hyperlipidemia        Past Surgical History:   Past Surgical History:   Procedure Laterality Date   • HERNIA REPAIR     • NASAL SEPTUM SURGERY         Family History:   Family History   Problem Relation Age of Onset   • No Known Problems Mother    • Coronary artery disease Father         PREMATURE   • Diabetes type II Maternal Grandmother        Social History:   Social History     Socioeconomic History   • Marital status: Single   Tobacco Use   • Smoking status: Never   • Smokeless tobacco: Never   Vaping Use   • Vaping Use: Never used   Substance and Sexual Activity   • Alcohol use: No   • Drug use: No   • Sexual activity: Defer       Allergies:   No Known Allergies    Objective     Physical Exam:  Vital Signs:   Vitals:    11/15/22 1404   BP: 128/76   BP Location: Right arm   Patient Position: Sitting   Cuff Size: Large Adult   Pulse: 83   Resp: 15   Temp: 98 °F (36.7 °C)   TempSrc: Temporal   SpO2: 98%   Weight: 87.5 kg (192 lb 12.8 oz)   Height: 172.7 cm (68\")     Body mass index is 29.32 kg/m².        Physical Exam  Constitutional:       General: He is not in acute distress.     " Appearance: Normal appearance.   Neurological:      Mental Status: He is alert.   Psychiatric:         Mood and Affect: Mood normal.         Behavior: Behavior normal.         Thought Content: Thought content normal.         Judgment: Judgment normal.         Procedures    Assessment / Plan      Assessment/Plan:   Diagnoses and all orders for this visit:    1. Hypercholesterolemia (Primary)  Assessment & Plan:  Mildly elevated LDL with low HDL.  Work on diet and exercise to lose a few pounds.  Exercise should help raise HDL.  He is not a tobacco user.  No indication at this time to be on cholesterol-lowering medication.      2. Primary hypertension  Assessment & Plan:  Hypertension is improving with treatment.  Continue current treatment regimen.  Blood pressure will be reassessed at the next regular appointment.      3. Bipolar 1 disorder (HCC)  Assessment & Plan:  Psychological condition is improving with treatment.  Continue current treatment regimen.  Psychological condition  will be reassessed at the next regular appointment.      4. HSV infection  Assessment & Plan:  Continue current prophylactic medication and follow-up again in 5 months.           Medications:     Current Outpatient Medications:   •  Cariprazine HCl (Vraylar) 1.5 MG capsule capsule, Take 1 capsule by mouth Daily., Disp: 90 capsule, Rfl: 1  •  ibuprofen (ADVIL,MOTRIN) 600 MG tablet, Take 1 tablet by mouth Every 8 (Eight) Hours As Needed for Moderate Pain or Headache., Disp: 90 tablet, Rfl: 0  •  lisinopril (PRINIVIL,ZESTRIL) 20 MG tablet, Take 1 tablet by mouth Daily., Disp: 90 tablet, Rfl: 1  •  sertraline (ZOLOFT) 50 MG tablet, Take 1 tablet by mouth Daily., Disp: 90 tablet, Rfl: 1  •  valACYclovir (VALTREX) 1000 MG tablet, Take 1 tablet by mouth Daily., Disp: 90 tablet, Rfl: 1        Follow Up:   Return in about 5 months (around 4/15/2023) for 30 minute med recheck.    Jamee Mitchell PA-C   [facility] [place] [facility]

## 2023-01-27 DIAGNOSIS — F31.9 BIPOLAR 1 DISORDER: ICD-10-CM

## 2023-01-30 RX ORDER — CARIPRAZINE 1.5 MG/1
CAPSULE, GELATIN COATED ORAL
Qty: 90 CAPSULE | Refills: 1 | OUTPATIENT
Start: 2023-01-30

## 2023-04-06 DIAGNOSIS — F31.9 BIPOLAR 1 DISORDER: ICD-10-CM

## 2023-04-06 DIAGNOSIS — B00.9 HSV INFECTION: ICD-10-CM

## 2023-04-07 RX ORDER — VALACYCLOVIR HYDROCHLORIDE 1 G/1
1000 TABLET, FILM COATED ORAL DAILY
Qty: 30 TABLET | Refills: 0 | Status: SHIPPED | OUTPATIENT
Start: 2023-04-07 | End: 2023-04-17 | Stop reason: SDUPTHER

## 2023-04-17 ENCOUNTER — OFFICE VISIT (OUTPATIENT)
Dept: FAMILY MEDICINE CLINIC | Facility: CLINIC | Age: 28
End: 2023-04-17
Payer: MEDICAID

## 2023-04-17 VITALS
TEMPERATURE: 97.1 F | DIASTOLIC BLOOD PRESSURE: 84 MMHG | HEART RATE: 83 BPM | WEIGHT: 198 LBS | SYSTOLIC BLOOD PRESSURE: 128 MMHG | HEIGHT: 68 IN | OXYGEN SATURATION: 97 % | BODY MASS INDEX: 30.01 KG/M2 | RESPIRATION RATE: 15 BRPM

## 2023-04-17 DIAGNOSIS — R12 HEARTBURN: ICD-10-CM

## 2023-04-17 DIAGNOSIS — B00.9 HSV INFECTION: ICD-10-CM

## 2023-04-17 DIAGNOSIS — Z00.00 ANNUAL PHYSICAL EXAM: Primary | ICD-10-CM

## 2023-04-17 DIAGNOSIS — I10 PRIMARY HYPERTENSION: ICD-10-CM

## 2023-04-17 DIAGNOSIS — F31.9 BIPOLAR 1 DISORDER: ICD-10-CM

## 2023-04-17 DIAGNOSIS — E78.2 MIXED HYPERLIPIDEMIA: ICD-10-CM

## 2023-04-17 DIAGNOSIS — G43.909 MIGRAINE SYNDROME: ICD-10-CM

## 2023-04-17 RX ORDER — IBUPROFEN 600 MG/1
600 TABLET ORAL EVERY 8 HOURS PRN
Qty: 90 TABLET | Refills: 0 | Status: SHIPPED | OUTPATIENT
Start: 2023-04-17

## 2023-04-17 RX ORDER — FAMOTIDINE 20 MG/1
20 TABLET, FILM COATED ORAL 2 TIMES DAILY
Qty: 180 TABLET | Refills: 3 | Status: SHIPPED | OUTPATIENT
Start: 2023-04-17

## 2023-04-17 RX ORDER — LISINOPRIL 20 MG/1
20 TABLET ORAL DAILY
Qty: 90 TABLET | Refills: 1 | Status: SHIPPED | OUTPATIENT
Start: 2023-04-17

## 2023-04-17 RX ORDER — VALACYCLOVIR HYDROCHLORIDE 1 G/1
1000 TABLET, FILM COATED ORAL DAILY
Qty: 90 TABLET | Refills: 3 | Status: SHIPPED | OUTPATIENT
Start: 2023-04-17

## 2023-04-17 NOTE — PATIENT INSTRUCTIONS

## 2023-04-17 NOTE — PROGRESS NOTES
Male Physical Note      Patient Name: Faisal Dougherty  : 1995   MRN: [id number]     Chief Complaint:    Chief Complaint   Patient presents with   • Follow-up       History of Present Illness: Faisal Dougherty is a 27 y.o. male who is here today for their annual health maintenance and physical.  Also here to follow-up on his chronic medical problems and has been doing well.  No complaints    Review of Systems   Constitutional: Negative for fatigue and fever.   HENT: Negative for ear pain and sore throat.    Eyes: Negative for visual disturbance.   Respiratory: Negative for cough, chest tightness and shortness of breath.    Cardiovascular: Negative for chest pain and palpitations.   Gastrointestinal: Negative for abdominal pain, blood in stool, melena, constipation, diarrhea, nausea and vomiting.   Endocrine: Negative for cold intolerance and heat intolerance.   Genitourinary: Negative for dysuria and hematuria.   Musculoskeletal: Negative for back pain and joint swelling.   Skin: Negative for rash and wound.   Allergic/Immunologic: Negative for environmental allergies and food allergies.         Subjective      Review of Systems:   Review of Systems    Past Medical History, Social History, Family History and Care Team were all reviewed with patient and updated as appropriate.     Medications:     Current Outpatient Medications:   •  Cariprazine HCl (Vraylar) 1.5 MG capsule capsule, Take 1 capsule by mouth Daily., Disp: 90 capsule, Rfl: 1  •  ibuprofen (ADVIL,MOTRIN) 600 MG tablet, Take 1 tablet by mouth Every 8 (Eight) Hours As Needed for Moderate Pain or Headache., Disp: 90 tablet, Rfl: 0  •  lisinopril (PRINIVIL,ZESTRIL) 20 MG tablet, Take 1 tablet by mouth Daily., Disp: 90 tablet, Rfl: 1  •  sertraline (ZOLOFT) 50 MG tablet, Take 1 tablet by mouth Daily., Disp: 90 tablet, Rfl: 3  •  valACYclovir (VALTREX) 1000 MG tablet, Take 1 tablet by mouth Daily., Disp: 90 tablet, Rfl: 3  •  famotidine (Pepcid) 20 MG  "tablet, Take 1 tablet by mouth 2 (Two) Times a Day., Disp: 180 tablet, Rfl: 3    Allergies:   No Known Allergies    I  CT for Smoker (Age 55-75, 30pk yr): N/A      Depression: PHQ-2 Depression Screening  PHQ-9 Total Score:         Intimate partner violence: (Screen on initial visit, older adult with injury or evidence of neglect):  • Violence can be a problem in many people's lives, so I now ask every patient about trauma or abuse they may have experienced in a relationship.  • Stress/Safety - Do you feel safe in your relationship?  • Afraid/Abused - Have you ever been in a relationship where you were threatened, hurt, or afraid?  • Friend/Family - Are your friends aware you have been hurt?  • Emergency Plan - Do you have a safe place to go and the resources you need in an emergency?    Osteoporosis:   • Men: history of low trauma fracture, androgen deprivation therapy for prostate cancer, hypogonadism, primary hyperparathyroidism, intestinal disorders.     Objective     Physical Exam:  Vital Signs:   Vitals:    04/17/23 0806   BP: 128/84   BP Location: Right arm   Patient Position: Sitting   Cuff Size: Adult   Pulse: 83   Resp: 15   Temp: 97.1 °F (36.2 °C)   TempSrc: Infrared   SpO2: 97%   Weight: 89.8 kg (198 lb)   Height: 172.7 cm (68\")   PainSc: 0-No pain     Body mass index is 30.11 kg/m².        Physical Exam    Procedures    Assessment / Plan      Assessment/Plan:   Diagnoses and all orders for this visit:    1. Annual physical exam (Primary)    2. Bipolar 1 disorder  -     Cariprazine HCl (Vraylar) 1.5 MG capsule capsule; Take 1 capsule by mouth Daily.  Dispense: 90 capsule; Refill: 1  -     sertraline (ZOLOFT) 50 MG tablet; Take 1 tablet by mouth Daily.  Dispense: 90 tablet; Refill: 3    3. Primary hypertension  -     lisinopril (PRINIVIL,ZESTRIL) 20 MG tablet; Take 1 tablet by mouth Daily.  Dispense: 90 tablet; Refill: 1  -     Comprehensive Metabolic Panel; Future  -     Comprehensive Metabolic " Panel    4. Migraine syndrome  -     ibuprofen (ADVIL,MOTRIN) 600 MG tablet; Take 1 tablet by mouth Every 8 (Eight) Hours As Needed for Moderate Pain or Headache.  Dispense: 90 tablet; Refill: 0    5. HSV infection  -     valACYclovir (VALTREX) 1000 MG tablet; Take 1 tablet by mouth Daily.  Dispense: 90 tablet; Refill: 3    6. Mixed hyperlipidemia    7. Heartburn  -     famotidine (Pepcid) 20 MG tablet; Take 1 tablet by mouth 2 (Two) Times a Day.  Dispense: 180 tablet; Refill: 3       He has been stable on all his medicines we will just refill those I we will add in some Pepcid for his heartburn 20 mg twice a day and he just uses ibuprofen intermittently.  Will follow-up in 6 months for repeat labs work on diet and exercise as directed avoid fried fatty foods eat more fruits vegetables and then if he does may just do lean meat like grilled chicken breast turkey fish grilled or baked not deep-fried    Safe sex practices discussed and told him to avoid drugs, and do not drink alcohol in excess --testicular exam discussed if any lumps or bumps or problems let us know      BMI is >= 30 and <35. (Class 1 Obesity). The following options were offered after discussion;: exercise counseling/recommendations and nutrition counseling/recommendations      Follow Up:   Return in about 6 months (around 10/17/2023) for Recheck, Labs prior next visit.    Healthcare Maintenance:   Faisal Michaelgayle voices understanding and acceptance of this advice and will call back with any further questions or concerns. AVS with preventive healthcare tips printed for patient.         Daniel aDvis MD  [facility] [place] [facility]  Portions of note created with Dragon voice recognition technology

## 2023-04-18 LAB
ALBUMIN SERPL-MCNC: 4.6 G/DL (ref 4.1–5.2)
ALBUMIN/GLOB SERPL: 1.7 {RATIO} (ref 1.2–2.2)
ALP SERPL-CCNC: 95 IU/L (ref 44–121)
ALT SERPL-CCNC: 31 IU/L (ref 0–44)
AST SERPL-CCNC: 20 IU/L (ref 0–40)
BILIRUB SERPL-MCNC: 1.1 MG/DL (ref 0–1.2)
BUN SERPL-MCNC: 9 MG/DL (ref 6–20)
BUN/CREAT SERPL: 9 (ref 9–20)
CALCIUM SERPL-MCNC: 9.4 MG/DL (ref 8.7–10.2)
CHLORIDE SERPL-SCNC: 102 MMOL/L (ref 96–106)
CO2 SERPL-SCNC: 25 MMOL/L (ref 20–29)
CREAT SERPL-MCNC: 0.99 MG/DL (ref 0.76–1.27)
EGFRCR SERPLBLD CKD-EPI 2021: 107 ML/MIN/1.73
GLOBULIN SER CALC-MCNC: 2.7 G/DL (ref 1.5–4.5)
GLUCOSE SERPL-MCNC: 101 MG/DL (ref 70–99)
POTASSIUM SERPL-SCNC: 4.6 MMOL/L (ref 3.5–5.2)
PROT SERPL-MCNC: 7.3 G/DL (ref 6–8.5)
SODIUM SERPL-SCNC: 141 MMOL/L (ref 134–144)

## 2023-08-03 DIAGNOSIS — I10 PRIMARY HYPERTENSION: ICD-10-CM

## 2023-08-04 RX ORDER — LISINOPRIL 20 MG/1
20 TABLET ORAL DAILY
Qty: 90 TABLET | Refills: 1 | OUTPATIENT
Start: 2023-08-04

## 2023-08-23 DIAGNOSIS — B00.9 HSV INFECTION: ICD-10-CM

## 2023-08-23 NOTE — TELEPHONE ENCOUNTER
"  Caller: Faisal Dougherty    Relationship: Self    Best call back number: [phone]    Requested Prescriptions:   Requested Prescriptions     Pending Prescriptions Disp Refills    valACYclovir (VALTREX) 1000 MG tablet 90 tablet 3     Sig: Take 1 tablet by mouth Daily.        Pharmacy where request should be sent: [address] [phone] [facility] [phone] [facility]     Last office visit with prescribing clinician: 4/17/2023   Last telemedicine visit with prescribing clinician: Visit date not found   Next office visit with prescribing clinician: 10/10/2023     Additional details provided by patient:     Does the patient have less than a 3 day supply:  [x] Yes  [] No    Santino Bonds Rep   08/23/23 13:28 EDT         DELETE AFTER READING TO PATIENT: "Thank you for sharing this information with me. I will send a message to the clinical team. Please allow 48 hours for the clinical staff to follow up on this request."      "

## 2023-08-24 RX ORDER — VALACYCLOVIR HYDROCHLORIDE 1 G/1
1000 TABLET, FILM COATED ORAL DAILY
Qty: 90 TABLET | Refills: 3 | OUTPATIENT
Start: 2023-08-24

## 2023-10-03 ENCOUNTER — LAB (OUTPATIENT)
Dept: FAMILY MEDICINE CLINIC | Facility: CLINIC | Age: 28
End: 2023-10-03
Payer: MEDICAID

## 2023-10-03 DIAGNOSIS — Z79.899 ENCOUNTER FOR LONG-TERM (CURRENT) USE OF OTHER MEDICATIONS: Primary | ICD-10-CM

## 2023-10-03 PROCEDURE — 36415 COLL VENOUS BLD VENIPUNCTURE: CPT | Performed by: FAMILY MEDICINE

## 2023-10-04 LAB
ALBUMIN SERPL-MCNC: 4.3 G/DL (ref 4.3–5.2)
ALBUMIN/GLOB SERPL: 1.7 {RATIO} (ref 1.2–2.2)
ALP SERPL-CCNC: 89 IU/L (ref 44–121)
ALT SERPL-CCNC: 35 IU/L (ref 0–44)
AST SERPL-CCNC: 20 IU/L (ref 0–40)
BASOPHILS # BLD AUTO: 0 X10E3/UL (ref 0–0.2)
BASOPHILS NFR BLD AUTO: 1 %
BILIRUB SERPL-MCNC: 1.2 MG/DL (ref 0–1.2)
BUN SERPL-MCNC: 9 MG/DL (ref 6–20)
BUN/CREAT SERPL: 10 (ref 9–20)
CALCIUM SERPL-MCNC: 9 MG/DL (ref 8.7–10.2)
CHLORIDE SERPL-SCNC: 101 MMOL/L (ref 96–106)
CHOLEST SERPL-MCNC: 202 MG/DL (ref 100–199)
CK SERPL-CCNC: 126 U/L (ref 49–439)
CO2 SERPL-SCNC: 24 MMOL/L (ref 20–29)
CREAT SERPL-MCNC: 0.87 MG/DL (ref 0.76–1.27)
EGFRCR SERPLBLD CKD-EPI 2021: 121 ML/MIN/1.73
EOSINOPHIL # BLD AUTO: 0.1 X10E3/UL (ref 0–0.4)
EOSINOPHIL NFR BLD AUTO: 2 %
ERYTHROCYTE [DISTWIDTH] IN BLOOD BY AUTOMATED COUNT: 12.7 % (ref 11.6–15.4)
GLOBULIN SER CALC-MCNC: 2.5 G/DL (ref 1.5–4.5)
GLUCOSE SERPL-MCNC: 98 MG/DL (ref 70–99)
HBA1C MFR BLD: 5.3 % (ref 4.8–5.6)
HCT VFR BLD AUTO: 46 % (ref 37.5–51)
HDLC SERPL-MCNC: 34 MG/DL
HGB BLD-MCNC: 14.8 G/DL (ref 13–17.7)
IMM GRANULOCYTES # BLD AUTO: 0 X10E3/UL (ref 0–0.1)
IMM GRANULOCYTES NFR BLD AUTO: 0 %
LDLC SERPL CALC-MCNC: 139 MG/DL (ref 0–99)
LYMPHOCYTES # BLD AUTO: 1.5 X10E3/UL (ref 0.7–3.1)
LYMPHOCYTES NFR BLD AUTO: 24 %
MCH RBC QN AUTO: 27.8 PG (ref 26.6–33)
MCHC RBC AUTO-ENTMCNC: 32.2 G/DL (ref 31.5–35.7)
MCV RBC AUTO: 86 FL (ref 79–97)
MONOCYTES # BLD AUTO: 0.6 X10E3/UL (ref 0.1–0.9)
MONOCYTES NFR BLD AUTO: 10 %
NEUTROPHILS # BLD AUTO: 3.8 X10E3/UL (ref 1.4–7)
NEUTROPHILS NFR BLD AUTO: 63 %
PLATELET # BLD AUTO: 265 X10E3/UL (ref 150–450)
POTASSIUM SERPL-SCNC: 4.4 MMOL/L (ref 3.5–5.2)
PROT SERPL-MCNC: 6.8 G/DL (ref 6–8.5)
RBC # BLD AUTO: 5.33 X10E6/UL (ref 4.14–5.8)
SODIUM SERPL-SCNC: 139 MMOL/L (ref 134–144)
TRIGL SERPL-MCNC: 158 MG/DL (ref 0–149)
TSH SERPL DL<=0.005 MIU/L-ACNC: 1.03 UIU/ML (ref 0.45–4.5)
VLDLC SERPL CALC-MCNC: 29 MG/DL (ref 5–40)
WBC # BLD AUTO: 6.1 X10E3/UL (ref 3.4–10.8)

## 2023-10-10 ENCOUNTER — OFFICE VISIT (OUTPATIENT)
Dept: FAMILY MEDICINE CLINIC | Facility: CLINIC | Age: 28
End: 2023-10-10
Payer: MEDICAID

## 2023-10-10 VITALS
WEIGHT: 195 LBS | SYSTOLIC BLOOD PRESSURE: 110 MMHG | BODY MASS INDEX: 29.55 KG/M2 | HEART RATE: 94 BPM | HEIGHT: 68 IN | DIASTOLIC BLOOD PRESSURE: 70 MMHG | OXYGEN SATURATION: 98 %

## 2023-10-10 DIAGNOSIS — Z79.899 HIGH RISK MEDICATION USE: ICD-10-CM

## 2023-10-10 DIAGNOSIS — F31.9 BIPOLAR 1 DISORDER: ICD-10-CM

## 2023-10-10 DIAGNOSIS — I10 PRIMARY HYPERTENSION: Primary | ICD-10-CM

## 2023-10-10 DIAGNOSIS — F33.42 RECURRENT MAJOR DEPRESSIVE DISORDER, IN FULL REMISSION: ICD-10-CM

## 2023-10-10 DIAGNOSIS — E78.00 HYPERCHOLESTEROLEMIA: ICD-10-CM

## 2023-10-10 PROCEDURE — 99214 OFFICE O/P EST MOD 30 MIN: CPT | Performed by: FAMILY MEDICINE

## 2023-10-10 PROCEDURE — 3074F SYST BP LT 130 MM HG: CPT | Performed by: FAMILY MEDICINE

## 2023-10-10 PROCEDURE — 1160F RVW MEDS BY RX/DR IN RCRD: CPT | Performed by: FAMILY MEDICINE

## 2023-10-10 PROCEDURE — 1159F MED LIST DOCD IN RCRD: CPT | Performed by: FAMILY MEDICINE

## 2023-10-10 PROCEDURE — 3078F DIAST BP <80 MM HG: CPT | Performed by: FAMILY MEDICINE

## 2023-10-10 RX ORDER — LISINOPRIL 20 MG/1
20 TABLET ORAL DAILY
Qty: 90 TABLET | Refills: 1 | Status: SHIPPED | OUTPATIENT
Start: 2023-10-10

## 2023-10-10 RX ORDER — SERTRALINE HYDROCHLORIDE 100 MG/1
100 TABLET, FILM COATED ORAL DAILY
Qty: 90 TABLET | Refills: 1 | Status: SHIPPED | OUTPATIENT
Start: 2023-10-10

## 2023-10-10 NOTE — PROGRESS NOTES
Follow Up Office Visit      Patient Name: Faisal Dougherty  : 1995   MRN: [id number]     Chief Complaint:    Chief Complaint   Patient presents with    Follow-up       History of Present Illness: Faisal Dougherty is a 28 y.o. male who is here today to   follow-up on his chronic medical problems and go over blood work.  He says he been doing fairly well but relates that maybe he his depression medicines need to be tweaked a little bit he says been a little more depressed and maybe a little brady.  Says he has been on the Zoloft and Vraylar for couple years now has not had any dosage adjustments.    He has no homicidal suicidal thoughts    Review of Systems   Constitutional: Negative for fatigue and fever.   Respiratory: Negative for cough and shortness of breath.    Cardiovascular: Negative for chest pain and palpitations.   Skin: Negative for rash or itching      Labs reviewed with him.  Cholesterol just minimally elevated as his sugar one-point    Subjective      Review of Systems:   Review of Systems    Past Medical History:   Past Medical History:   Diagnosis Date    Anxiety     Hypertension     Mixed hyperlipidemia        Past Surgical History:   Past Surgical History:   Procedure Laterality Date    HERNIA REPAIR      NASAL SEPTUM SURGERY         Family History:   Family History   Problem Relation Age of Onset    No Known Problems Mother     Coronary artery disease Father         PREMATURE    Diabetes type II Maternal Grandmother        Social History:   Social History     Socioeconomic History    Marital status: Single   Tobacco Use    Smoking status: Never    Smokeless tobacco: Never   Vaping Use    Vaping Use: Never used   Substance and Sexual Activity    Alcohol use: No    Drug use: No    Sexual activity: Defer       Medications:     Current Outpatient Medications:     Cariprazine HCl (Vraylar) 1.5 MG capsule capsule, Take 1 capsule by mouth Daily., Disp: 90 capsule, Rfl: 1    lisinopril  "(PRINIVIL,ZESTRIL) 20 MG tablet, Take 1 tablet by mouth Daily., Disp: 90 tablet, Rfl: 1    famotidine (Pepcid) 20 MG tablet, Take 1 tablet by mouth 2 (Two) Times a Day., Disp: 180 tablet, Rfl: 3    ibuprofen (ADVIL,MOTRIN) 600 MG tablet, Take 1 tablet by mouth Every 8 (Eight) Hours As Needed for Moderate Pain or Headache., Disp: 90 tablet, Rfl: 0    sertraline (Zoloft) 100 MG tablet, Take 1 tablet by mouth Daily., Disp: 90 tablet, Rfl: 1    valACYclovir (VALTREX) 1000 MG tablet, Take 1 tablet by mouth Daily., Disp: 90 tablet, Rfl: 3    Allergies:   No Known Allergies    Objective     Physical Exam:  Vital Signs:   Vitals:    10/10/23 0837   BP: 110/70   BP Location: Right arm   Patient Position: Sitting   Cuff Size: Large Adult   Pulse: 94   SpO2: 98%   Weight: 88.5 kg (195 lb)   Height: 172.7 cm (68\")     Body mass index is 29.65 kg/mý.     Physical Exam  Vitals and nursing note reviewed.   Constitutional:       Appearance: Normal appearance.   HENT:      Head: Normocephalic and atraumatic.      Nose: Nose normal.   Cardiovascular:      Rate and Rhythm: Normal rate and regular rhythm.   Pulmonary:      Effort: Pulmonary effort is normal.      Breath sounds: Normal breath sounds.   Musculoskeletal:         General: Normal range of motion.      Cervical back: Normal range of motion and neck supple.      Right lower leg: No edema.      Left lower leg: No edema.   Skin:     General: Skin is warm and dry.   Neurological:      General: No focal deficit present.      Mental Status: He is alert.   Psychiatric:         Mood and Affect: Mood normal.         Behavior: Behavior normal.         Thought Content: Thought content normal.         Judgment: Judgment normal.         Procedures    PHQ-9 Total Score: 0     Assessment / Plan      Assessment/Plan:   Diagnoses and all orders for this visit:    1. Primary hypertension (Primary)  -     lisinopril (PRINIVIL,ZESTRIL) 20 MG tablet; Take 1 tablet by mouth Daily.  Dispense: 90 " tablet; Refill: 1    2. Bipolar 1 disorder  -     Cariprazine HCl (Vraylar) 1.5 MG capsule capsule; Take 1 capsule by mouth Daily.  Dispense: 90 capsule; Refill: 1    3. Recurrent major depressive disorder, in full remission  -     sertraline (Zoloft) 100 MG tablet; Take 1 tablet by mouth Daily.  Dispense: 90 tablet; Refill: 1    4. Hypercholesterolemia    5. High risk medication use  -     CBC Auto Differential; Future  -     Comprehensive Metabolic Panel; Future         We will continue with lisinopril and Vraylar as directed    Increase Zoloft to 100 mg/day see if this helps a little bit with mood and depression he will follow-up and let me know in 4 to 6 weeks if not better we may consider other options such as adding in bupropion and increasing the dose or switching SSRIs etc.    Continue good diet exercise and we will follow-up in 6 months for physical and repeat blood work.  He declined flu shot today         Follow Up:   Return in about 6 months (around 4/10/2024) for Annual physical, Labs prior next visit.        Daniel Davis MD  [facility] [place] [facility]   Portions of note created with Dragon voice recognition technology

## 2023-11-07 ENCOUNTER — READMISSION MANAGEMENT (OUTPATIENT)
Dept: CALL CENTER | Facility: HOSPITAL | Age: 28
End: 2023-11-07
Payer: MEDICAID

## 2023-11-07 NOTE — OUTREACH NOTE
Prep Survey      Flowsheet Row Responses   [facility]? [facility]   Is LACE score < 7 ? Non-BH Discharge   Eligibility [facility]   Date of Discharge 11/07/23   Discharge Disposition Home or Self Care   Discharge diagnosis ATRIAL FIBRILLATION WITH RVR   Does the patient have one of the following disease processes/diagnoses(primary or secondary)? Other   Prep survey completed? Yes            Rosalie LEYVA - Registered Nurse

## 2023-11-08 ENCOUNTER — TRANSITIONAL CARE MANAGEMENT TELEPHONE ENCOUNTER (OUTPATIENT)
Dept: CALL CENTER | Facility: HOSPITAL | Age: 28
End: 2023-11-08
Payer: MEDICAID

## 2023-11-08 NOTE — OUTREACH NOTE
"Call Center TCM Note      Flowsheet Row Responses   [facility] patient discharged from? Non-   Does the patient have one of the following disease processes/diagnoses(primary or secondary)? Other   TCM attempt successful? Yes   Call start time 1307   Call end time 1311   Discharge diagnosis ATRIAL FIBRILLATION WITH RVR   Meds reviewed with patient/caregiver? Yes   Is the patient having any side effects they believe may be caused by any medication additions or changes? No   Does the patient have all medications ordered at discharge? Yes   Is the patient taking all medications as directed (includes completed medication regime)? Yes   Medication comments changed meds yestrday, stopped lisinopril. heart monitor x 6 weeks   Comments pt wanted to make appt on his own.   Does the patient have an appointment with their PCP within 7-14 days of discharge? No   Nursing Interventions Routed TCM call to PCP office, Patient declined scheduling/rescheduling appointment at this time, PCP office requested to make appointment - message sent   Psychosocial issues? No   Did the patient receive a copy of their discharge instructions? Yes   What is the patient's perception of their health status since discharge? Improving   Is the patient/caregiver able to teach back signs and symptoms related to disease process for when to call PCP? Yes   Is the patient/caregiver able to teach back signs and symptoms related to disease process for when to call 911? Yes   Is the patient/caregiver able to teach back the hierarchy of who to call/visit for symptoms/problems? PCP, Specialist, Home health nurse, Urgent Care, ED, 911 Yes   If the patient is a current smoker, are they able to teach back resources for cessation? Not a smoker   TCM call completed? Yes   Wrap up additional comments pt stated he went in and they \"shocked\" his heart yesterday. went back in rhythm. back to work today   Call end time 1311   Would this patient benefit from a " Referral to [facility] Social Work? No   Is the patient interested in additional calls from an ambulatory ? No            Gabbi Petersen RN    11/8/2023, 13:12 EST

## 2023-11-15 ENCOUNTER — TELEPHONE (OUTPATIENT)
Dept: FAMILY MEDICINE CLINIC | Facility: CLINIC | Age: 28
End: 2023-11-15
Payer: MEDICAID

## 2023-12-21 ENCOUNTER — TELEPHONE (OUTPATIENT)
Dept: FAMILY MEDICINE CLINIC | Facility: CLINIC | Age: 28
End: 2023-12-21
Payer: MEDICAID

## 2023-12-21 NOTE — TELEPHONE ENCOUNTER
[facility] CALLED TO GET A PRESCRIPTION FOR ELIQUIS 5MG (1 BID) WRITTEN FOR THE PATIENT. PATIENT WAS SEEN IN THE ER AT [facility] BY DR. SULLIVAN. [facility] STATES THEY HAVE NOT BEEN ABLE TO GET IN TOUCH WITH THE ER DOCTOR FOR A REFILL FOR TWO WEEKS. THEY STATE THE PATIENT IS OUT. ADVISED THAT DR. KEENE MIGHT HAVE TO SEE THE PATIENT FIRST BEFORE PRESCRIBING A NEW MEDICATION.

## 2023-12-22 ENCOUNTER — TELEPHONE (OUTPATIENT)
Dept: FAMILY MEDICINE CLINIC | Facility: CLINIC | Age: 28
End: 2023-12-22
Payer: MEDICAID

## 2023-12-22 NOTE — TELEPHONE ENCOUNTER
Patient has a history of paroxysmal A-fib and his history.  He needs a refill of his Eliquis.  I tried calling patient and got no answer.  I called [facility] pharmacy and saw that he had been on it and had been given a written prescription.  I called in 30 days and told the pharmacist to have him make appointment see Dr. Davis.

## 2024-04-15 ENCOUNTER — OFFICE VISIT (OUTPATIENT)
Dept: FAMILY MEDICINE CLINIC | Facility: CLINIC | Age: 29
End: 2024-04-15
Payer: MEDICAID

## 2024-04-15 VITALS
SYSTOLIC BLOOD PRESSURE: 130 MMHG | HEIGHT: 68 IN | OXYGEN SATURATION: 97 % | WEIGHT: 202.1 LBS | BODY MASS INDEX: 30.63 KG/M2 | RESPIRATION RATE: 16 BRPM | DIASTOLIC BLOOD PRESSURE: 90 MMHG | HEART RATE: 103 BPM

## 2024-04-15 DIAGNOSIS — F31.9 BIPOLAR 1 DISORDER: Primary | ICD-10-CM

## 2024-04-15 DIAGNOSIS — F33.42 RECURRENT MAJOR DEPRESSIVE DISORDER, IN FULL REMISSION: ICD-10-CM

## 2024-04-15 DIAGNOSIS — R12 HEARTBURN: ICD-10-CM

## 2024-04-15 DIAGNOSIS — I48.0 PAROXYSMAL ATRIAL FIBRILLATION: ICD-10-CM

## 2024-04-15 DIAGNOSIS — F31.9 BIPOLAR 1 DISORDER: ICD-10-CM

## 2024-04-15 PROCEDURE — 93000 ELECTROCARDIOGRAM COMPLETE: CPT | Performed by: PHYSICIAN ASSISTANT

## 2024-04-15 PROCEDURE — 3075F SYST BP GE 130 - 139MM HG: CPT | Performed by: PHYSICIAN ASSISTANT

## 2024-04-15 PROCEDURE — 3080F DIAST BP >= 90 MM HG: CPT | Performed by: PHYSICIAN ASSISTANT

## 2024-04-15 PROCEDURE — 99214 OFFICE O/P EST MOD 30 MIN: CPT | Performed by: PHYSICIAN ASSISTANT

## 2024-04-15 RX ORDER — ATORVASTATIN CALCIUM 40 MG/1
40 TABLET, FILM COATED ORAL DAILY
COMMUNITY
Start: 2023-12-08

## 2024-04-15 RX ORDER — CARIPRAZINE 1.5 MG/1
1.5 CAPSULE, GELATIN COATED ORAL DAILY
Qty: 90 CAPSULE | Refills: 1 | OUTPATIENT
Start: 2024-04-15

## 2024-04-15 RX ORDER — BUPROPION HYDROCHLORIDE 150 MG/1
150 TABLET ORAL DAILY
Qty: 30 TABLET | Refills: 0 | Status: SHIPPED | OUTPATIENT
Start: 2024-04-15

## 2024-04-15 RX ORDER — FAMOTIDINE 20 MG/1
20 TABLET, FILM COATED ORAL 2 TIMES DAILY
Qty: 180 TABLET | Refills: 3 | Status: SHIPPED | OUTPATIENT
Start: 2024-04-15

## 2024-04-15 RX ORDER — SERTRALINE HYDROCHLORIDE 100 MG/1
100 TABLET, FILM COATED ORAL DAILY
Qty: 90 TABLET | Refills: 1 | OUTPATIENT
Start: 2024-04-15

## 2024-04-15 NOTE — ASSESSMENT & PLAN NOTE
EKG today normal except for nonspecific T wave abnormality.  He is in sinus rhythm.  Will refer to cardiology.  His records from [facility] hospital stay in November were reviewed through care everywhere.  It is unclear as to why he was in atrial fibrillation.  From what I can tell he was converted in the hospital and started on blood thinner.  He had had a lot of nausea and vomiting prior to being admitted.  Will hold off on changing any of his cardiac meds at this time.

## 2024-04-15 NOTE — ASSESSMENT & PLAN NOTE
Mood symptoms are worse.  Patient to restart his Vraylar and we will try adding bupropion instead of sertraline.  I made an urgent referral to [facility] and he is to keep his follow-up appointment with Dr. Davis on May 10.

## 2024-04-15 NOTE — PROGRESS NOTES
Patient Office Visit      Patient Name: Faisal Dougherty  : 1995   MRN: [id number]     Chief Complaint:    Chief Complaint   Patient presents with    Atrial Fibrillation    Depression    Anxiety       History of Present Illness: Faisal Dougherty is a 28 y.o. male who is here today complaining of worsening depression.  He was also hospitalized for 2 nights back at the end of November at [facility] for atrial fibrillation and was lost to follow-up.  He says he is stopped taking all of his medications.  It is unclear as to how long he has been off of the blood thinner.  He is struggling with his mood he says because of the anniversary of his brother's death coming up.  He reports being off of his medication but it is unclear how long ago he stopped taking.  He said he really did not notice any difference when he was taking his Vraylar and his sertraline also did not see the point taking.  He has an appointment coming up with Dr. Davis but it is not until May 10.    Subjective      Review of Systems:         Past Medical History:   Past Medical History:   Diagnosis Date    Anxiety     Hypertension     Mixed hyperlipidemia        Past Surgical History:   Past Surgical History:   Procedure Laterality Date    HERNIA REPAIR      NASAL SEPTUM SURGERY         Family History:   Family History   Problem Relation Age of Onset    No Known Problems Mother     Coronary artery disease Father         PREMATURE    Diabetes type II Maternal Grandmother        Social History:   Social History     Socioeconomic History    Marital status: Single   Tobacco Use    Smoking status: Never     Passive exposure: Never    Smokeless tobacco: Never   Vaping Use    Vaping status: Never Used   Substance and Sexual Activity    Alcohol use: No    Drug use: No    Sexual activity: Defer       Allergies:   No Known Allergies    Objective     Physical Exam:  Vital Signs:   Vitals:    04/15/24 0915   BP: 130/90   BP Location:  "Left arm   Patient Position: Sitting   Cuff Size: Large Adult   Pulse: 103   Resp: 16   SpO2: 97%   Weight: 91.7 kg (202 lb 1.6 oz)   Height: 172.7 cm (68\")     Body mass index is 30.73 kg/m².           Physical Exam  Constitutional:       Appearance: Normal appearance.   Neurological:      Mental Status: He is alert.   Psychiatric:         Attention and Perception: Attention normal.         Mood and Affect: Mood is depressed.         Speech: Speech is delayed.         Behavior: Behavior is withdrawn.         Thought Content: Thought content normal. Thought content does not include homicidal or suicidal ideation. Thought content does not include homicidal or suicidal plan.         Cognition and Memory: Cognition normal.         Judgment: Judgment normal.           ECG 12 Lead    Date/Time: 4/15/2024 10:20 AM  Performed by: Jamee Mitchell PA    Authorized by: Jamee Mitchell PA  Comparison: compared with previous ECG from 10/26/2022  Comparison to previous ECG: Previous EKG with sinus tachycardia, otherwise similar.   Rhythm: sinus rhythm  Rate: normal  BPM: 80  Conduction: conduction normal  ST Segments: ST segments normal  QRS axis: normal  Other findings: T wave abnormality    Clinical impression: non-specific ECG  Comments: Non-specific T wave abnormality.           Assessment / Plan      Assessment/Plan:   Diagnoses and all orders for this visit:    1. Bipolar 1 disorder (Primary)  Assessment & Plan:  Mood symptoms are worse.  Patient to restart his Vraylar and we will try adding bupropion instead of sertraline.  I made an urgent referral to [facility] and he is to keep his follow-up appointment with Dr. Davis on May 10.    Orders:  -     Ambulatory Referral to Psychiatry  -     Cariprazine HCl (Vraylar) 1.5 MG capsule capsule; Take 1 capsule by mouth Daily.  Dispense: 30 capsule; Refill: 0  -     ECG 12 Lead    2. Paroxysmal atrial fibrillation  Assessment & Plan:  EKG today " normal except for nonspecific T wave abnormality.  He is in sinus rhythm.  Will refer to cardiology.  His records from [facility] hospital stay in November were reviewed through care everywhere.  It is unclear as to why he was in atrial fibrillation.  From what I can tell he was converted in the hospital and started on blood thinner.  He had had a lot of nausea and vomiting prior to being admitted.  Will hold off on changing any of his cardiac meds at this time.    Orders:  -     Ambulatory Referral to Cardiology  -     buPROPion XL (Wellbutrin XL) 150 MG 24 hr tablet; Take 1 tablet by mouth Daily.  Dispense: 30 tablet; Refill: 0  -     Cariprazine HCl (Vraylar) 1.5 MG capsule capsule; Take 1 capsule by mouth Daily.  Dispense: 30 capsule; Refill: 0             Medications:     Current Outpatient Medications:     atorvastatin (LIPITOR) 40 MG tablet, Take 1 tablet by mouth Daily., Disp: , Rfl:     Cariprazine HCl (Vraylar) 1.5 MG capsule capsule, Take 1 capsule by mouth Daily., Disp: 30 capsule, Rfl: 0    famotidine (Pepcid) 20 MG tablet, Take 1 tablet by mouth 2 (Two) Times a Day., Disp: 180 tablet, Rfl: 3    lisinopril (PRINIVIL,ZESTRIL) 20 MG tablet, Take 1 tablet by mouth Daily., Disp: 90 tablet, Rfl: 1    METOPROLOL TARTRATE PO, Take 25 mg by mouth 2 (Two) Times a Day., Disp: , Rfl:     valACYclovir (VALTREX) 1000 MG tablet, Take 1 tablet by mouth Daily., Disp: 90 tablet, Rfl: 3    buPROPion XL (Wellbutrin XL) 150 MG 24 hr tablet, Take 1 tablet by mouth Daily., Disp: 30 tablet, Rfl: 0        Follow Up:   No follow-ups on file.    Jamee Mitchell PA-C   [facility]     NOTE TO PATIENT: The 21st Century Cures Act makes medical notes like these available to patients in the interest of transparency. However, be advised this is a medical document. It is intended as peer to peer communication. It is written in medical language and may contain abbreviations or verbiage that  are unfamiliar. It may appear blunt or direct. Medical documents are intended to carry relevant information, facts as evident, and the clinical opinion of the practitioner.

## 2024-04-18 ENCOUNTER — OFFICE VISIT (OUTPATIENT)
Dept: CARDIOLOGY | Facility: CLINIC | Age: 29
End: 2024-04-18
Payer: MEDICAID

## 2024-04-18 VITALS
DIASTOLIC BLOOD PRESSURE: 70 MMHG | SYSTOLIC BLOOD PRESSURE: 112 MMHG | BODY MASS INDEX: 30.19 KG/M2 | OXYGEN SATURATION: 97 % | HEART RATE: 77 BPM | HEIGHT: 68 IN | WEIGHT: 199.2 LBS | RESPIRATION RATE: 18 BRPM

## 2024-04-18 DIAGNOSIS — E78.00 HYPERCHOLESTEROLEMIA: ICD-10-CM

## 2024-04-18 DIAGNOSIS — I10 PRIMARY HYPERTENSION: Primary | ICD-10-CM

## 2024-04-18 DIAGNOSIS — I48.0 PAROXYSMAL ATRIAL FIBRILLATION: ICD-10-CM

## 2024-04-18 PROCEDURE — 93000 ELECTROCARDIOGRAM COMPLETE: CPT | Performed by: INTERNAL MEDICINE

## 2024-04-18 PROCEDURE — 1159F MED LIST DOCD IN RCRD: CPT | Performed by: INTERNAL MEDICINE

## 2024-04-18 PROCEDURE — 1160F RVW MEDS BY RX/DR IN RCRD: CPT | Performed by: INTERNAL MEDICINE

## 2024-04-18 PROCEDURE — 99204 OFFICE O/P NEW MOD 45 MIN: CPT | Performed by: INTERNAL MEDICINE

## 2024-04-18 PROCEDURE — 3074F SYST BP LT 130 MM HG: CPT | Performed by: INTERNAL MEDICINE

## 2024-04-18 PROCEDURE — 3078F DIAST BP <80 MM HG: CPT | Performed by: INTERNAL MEDICINE

## 2024-04-18 RX ORDER — SERTRALINE HYDROCHLORIDE 25 MG/1
25 TABLET, FILM COATED ORAL DAILY
COMMUNITY
Start: 2023-11-06

## 2024-04-18 NOTE — PROGRESS NOTES
[facility]  [facility] CARDIOLOGY  [address] [place] [address]  Dept: [phone]  Dept Fax: [phone]    Date: 04/18/2024  Patient: Faisal Dougherty  YOB: 1995    New Patient Office Note    Consult Reason:  Mr. Faisal Dougherty is a 28 y.o. male who presents to the clinic to establish care, seen for Atrial Fibrillation.   When seen patient doing well no complaints.  No recurrence of palpitations or atrial fibrillation.  Patient denies angina, orthopnea, PND, palpitations, lightheadedness, syncope or medications side-effects.    Patient was admitted for atrial fibrillation around [place] after vomiting every 10 minutes for all day.  Inpatient he had workup done including an echocardiogram; stress test and sleep studies where deferred to the outpatient setting, but never happened.  Patient had a 30-day event monitor and he was never informed the results but asked to stop Eliquis.    The following portions of the patient's history were reviewed and updated as appropriate: allergies, current medications, past family history, past medical history, past social history, past surgical history, and problem list.    Medications: No Known Allergies   Current Outpatient Medications   Medication Instructions    atorvastatin (LIPITOR) 40 mg, Oral, Daily    buPROPion XL (WELLBUTRIN XL) 150 mg, Oral, Daily    Cariprazine HCl (VRAYLAR) 1.5 mg, Oral, Daily    famotidine (PEPCID) 20 mg, Oral, 2 Times Daily    lisinopril (PRINIVIL,ZESTRIL) 20 mg, Oral, Daily    METOPROLOL TARTRATE PO 25 mg, Oral, 2 Times Daily    sertraline (ZOLOFT) 25 mg, Oral, Daily    valACYclovir (VALTREX) 1,000 mg, Oral, Daily       Subjective  Past Medical History:   Diagnosis Date    Anxiety     Hypertension     Mixed hyperlipidemia        Past Surgical History:   Procedure Laterality Date    HERNIA REPAIR      NASAL SEPTUM SURGERY         Family History   Problem Relation Age of Onset    No Known Problems  "Mother     Coronary artery disease Father         PREMATURE    Diabetes type II Maternal Grandmother         Social History     Socioeconomic History    Marital status: Single   Tobacco Use    Smoking status: Never     Passive exposure: Never    Smokeless tobacco: Never   Vaping Use    Vaping status: Never Used   Substance and Sexual Activity    Alcohol use: No    Drug use: No    Sexual activity: Defer       Objective  Vitals:    04/18/24 1302   BP: 112/70   Pulse: 77   Resp: 18   SpO2: 97%   Weight: 90.4 kg (199 lb 3.2 oz)   Height: 172.7 cm (68\")   PainSc: 0-No pain     Vitals:    04/18/24 1302   BP: 112/70   Pulse: 77   Resp: 18   SpO2: 97%   Weight: 90.4 kg (199 lb 3.2 oz)   Height: 172.7 cm (68\")        Physical Exam  Constitutional:       Appearance: Healthy appearance. Not in distress.   Eyes:      Pupils: Pupils are equal, round, and reactive to light.   HENT:    Mouth/Throat:      Mouth: Mucous membranes are moist.   Neck:      Vascular: No carotid bruit, hepatojugular reflux, JVD or JVR. JVD normal.   Pulmonary:      Effort: Pulmonary effort is normal.      Breath sounds: Normal breath sounds. No wheezing. No rhonchi. No rales.   Chest:      Chest wall: Not tender to palpatation.   Cardiovascular:      PMI at left midclavicular line. Normal rate. Regular rhythm. Normal S1 with normal intensity. Normal S2 with normal intensity.       Murmurs: There is no murmur.      No gallop.  No click. No rub.   Pulses:     Carotid: 4+ bilaterally.     Radial: 4+ bilaterally.     Popliteal: 4+ bilaterally.     Dorsalis pedis: 4+ bilaterally.  Edema:     Peripheral edema absent.   Abdominal:      General: There is no abdominal bruit.   Skin:     General: Skin is warm.   Neurological:      Mental Status: Alert and oriented to person, place and time.              Labs:  Lab Results   Component Value Date     10/03/2023    K 4.4 10/03/2023     10/03/2023    CO2 24 10/03/2023    BUN 9 10/03/2023    CREATININE 0.87 " "10/03/2023    CALCIUM 9.0 10/03/2023    BILITOT 1.2 10/03/2023    ALKPHOS 89 10/03/2023    ALT 35 10/03/2023    AST 20 10/03/2023    GLUCOSE 98 10/03/2023    ALBUMIN 4.3 10/03/2023     Lab Results   Component Value Date    WBC 6.1 10/03/2023    HGB 14.8 10/03/2023    HCT 46.0 10/03/2023     10/03/2023     No results found for: \"APTT\", \"INR\", \"PTT\"  Lab Results   Component Value Date    CKTOTAL 126 10/03/2023    CKTOTAL 142 10/26/2022     No results found for: \"BNP\", \"PROBNP\"    Lab Results   Component Value Date    CHLPL 202 (H) 10/03/2023    TRIG 158 (H) 10/03/2023    HDL 34 (L) 10/03/2023     (H) 10/03/2023     Lab Results   Component Value Date    TSH 1.030 10/03/2023    FREET4 1.10 10/26/2022       The ASCVD Risk score ([place], et al., 2019) failed to calculate for the following reasons:    The 2019 ASCVD risk score is only valid for ages 40 to 79     [facility]:    ECG 12 Lead    Date/Time: 4/18/2024 1:54 PM  Performed by: Liam Barnes MD    Authorized by: Liam Barnes MD  Comparison: compared with previous ECG from 4/15/2024  Similar to previous ECG  Rhythm: sinus rhythm  Other findings: T wave abnormality  Comments: Normal sinus rhythm with nonspecific T wave abnormality          CXR: No results found for this or any previous visit.     ECHO/MUGA: No results found for this or any previous visit.     STRESS TESTS: No results found for this or any previous visit.     CARDIAC CATH: No results found for this or any previous visit.     DEVICES: No valid procedures specified.   HOLTER: No results found for this or any previous visit.     CT/MRI:  No results found for this or any previous visit.    VASCULAR: No valid procedures specified.     Assessment and Plan  Diagnoses and all orders for this visit:    1. Primary hypertension (Primary)  Assessment & Plan:  Hypertension is stable and controlled  Continue current treatment regimen.  Dietary sodium restriction.  Weight loss.  Regular " aerobic exercise.  Ambulatory blood pressure monitoring.  Blood pressure will be reassessed in 6 months.      2. Hypercholesterolemia  Assessment & Plan:   Lipid abnormalities are newly identified    Plan:  Continue same medication/s without change.      Discussed medication dosage, use, side effects, and goals of treatment in detail.    Counseled patient on lifestyle modifications to help control hyperlipidemia.   Cholesterol lowering dietary information shared with patient.  Advised patient to exercise for 150 minutes weekly. (30 minute brisk walk, 5 days a week for example)  Weight Loss encouraged  Patient counseled in regards to heart healthy, low fat/ low cholesterol/low sat diet, daily exercise for 30 minutes, low to moderate intensity, and weight loss.  Patient had some labs done at his primary care's office after starting the cholesterol pill, and we will retrieve them to assess if patient at goal.  Patient Treatment Goals:   LDL goal is less than 70    Followup in 6 months.      3. Paroxysmal atrial fibrillation  Assessment & Plan:  Triggered by severe dehydration probable electrolyte abnormalities.  Awaiting medical records.  Also awaiting results of 30-day event monitor.  Patient was instructed to stop Eliquis so likely no recurrence.  In any case patient had symptoms and felt his atrial fibrillation, so likely symptomatic if recurrence. Plan:  Complete the workup by ordering treadmill nuclear stress test is patient the setting of borderline EKG abnormalities and early onset hypertension (started in 2020)  Refer patient for home sleep studies  Monitor for recurrence clinically, and discussed with patient's getting an outpatient monitoring device of type Apple Watch or [employer] versus a blood pressure monitor that will alert patient if has irregular heartbeat    Orders:  -     ECG 12 Lead  -     Stress Test With Myocardial Perfusion One Day; Future  -     Home Sleep Study; Future         Return in  about 6 months (around 10/18/2024) for Next scheduled follow up, Follow-up with Dr Barnes.    There are no Patient Instructions on file for this visit.    Liam Barnes MD   100

## 2024-04-18 NOTE — ASSESSMENT & PLAN NOTE
Lipid abnormalities are newly identified    Plan:  Continue same medication/s without change.      Discussed medication dosage, use, side effects, and goals of treatment in detail.    Counseled patient on lifestyle modifications to help control hyperlipidemia.   Cholesterol lowering dietary information shared with patient.  Advised patient to exercise for 150 minutes weekly. (30 minute brisk walk, 5 days a week for example)  Weight Loss encouraged  Patient counseled in regards to heart healthy, low fat/ low cholesterol/low sat diet, daily exercise for 30 minutes, low to moderate intensity, and weight loss.  Patient had some labs done at his primary care's office after starting the cholesterol pill, and we will retrieve them to assess if patient at goal.  Patient Treatment Goals:   LDL goal is less than 70    Followup in 6 months.

## 2024-04-18 NOTE — ASSESSMENT & PLAN NOTE
Triggered by severe dehydration probable electrolyte abnormalities.  Awaiting medical records.  Also awaiting results of 30-day event monitor.  Patient was instructed to stop Eliquis so likely no recurrence.  In any case patient had symptoms and felt his atrial fibrillation, so likely symptomatic if recurrence. Plan:  Complete the workup by ordering treadmill nuclear stress test is patient the setting of borderline EKG abnormalities and early onset hypertension (started in 2020)  Refer patient for home sleep studies  Monitor for recurrence clinically, and discussed with patient's getting an outpatient monitoring device of type Apple Watch or [employer] mobile versus a blood pressure monitor that will alert patient if has irregular heartbeat

## 2024-04-26 ENCOUNTER — TELEPHONE (OUTPATIENT)
Dept: CARDIOLOGY | Facility: CLINIC | Age: 29
End: 2024-04-26
Payer: MEDICAID

## 2024-04-26 ENCOUNTER — CLINICAL SUPPORT (OUTPATIENT)
Dept: CARDIOLOGY | Facility: CLINIC | Age: 29
End: 2024-04-26
Payer: MEDICAID

## 2024-04-26 DIAGNOSIS — R07.89 CHEST PAIN, ATYPICAL: Primary | ICD-10-CM

## 2024-04-26 PROCEDURE — 93000 ELECTROCARDIOGRAM COMPLETE: CPT | Performed by: INTERNAL MEDICINE

## 2024-04-26 NOTE — PROGRESS NOTES
[facility]  [facility] CARDIOLOGY  [address] [place] [address]  Dept: [phone]  Dept Fax: [phone]    Date: 04/26/2024  Patient: Faisal Dougherty  YOB: 1995    Procedure Note    ECG 12 Lead    Date/Time: 4/26/2024 9:37 AM  Performed by: Liam Barnes MD    Authorized by: Liam Barnes MD  Comparison: compared with previous ECG   Similar to previous ECG  Rhythm: sinus rhythm  Other findings: T wave abnormality  Comments: Normal sinus rhythm with nonspecific T wave abnormality.           Assessment and Plan  Diagnoses and all orders for this visit:    1. Chest pain, atypical (Primary)  -     Treadmill Stress Test; Future         Liam Barnes MD

## 2024-04-26 NOTE — TELEPHONE ENCOUNTER
Caller: Faisal Dougherty    Relationship to patient: Self    Best call back number: [phone]    Patient is needing: TO KNOW IF HE NEEDS TO GO TO THE ED FOR AFIB SYMPTOMS THAT BEGAN TODAY. PLEASE ADVISE THANK YOU

## 2024-04-30 ENCOUNTER — TELEMEDICINE (OUTPATIENT)
Dept: PSYCHIATRY | Facility: CLINIC | Age: 29
End: 2024-04-30
Payer: MEDICAID

## 2024-04-30 DIAGNOSIS — F41.1 GENERALIZED ANXIETY DISORDER: ICD-10-CM

## 2024-04-30 DIAGNOSIS — F43.10 POST TRAUMATIC STRESS DISORDER (PTSD): ICD-10-CM

## 2024-04-30 DIAGNOSIS — F33.1 MODERATE EPISODE OF RECURRENT MAJOR DEPRESSIVE DISORDER: Primary | ICD-10-CM

## 2024-04-30 PROCEDURE — 90792 PSYCH DIAG EVAL W/MED SRVCS: CPT

## 2024-04-30 PROCEDURE — 1159F MED LIST DOCD IN RCRD: CPT

## 2024-04-30 PROCEDURE — 1160F RVW MEDS BY RX/DR IN RCRD: CPT
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
## 2024-05-02 ENCOUNTER — TELEPHONE (OUTPATIENT)
Dept: CARDIOLOGY | Facility: CLINIC | Age: 29
End: 2024-05-02

## 2024-05-02 ENCOUNTER — OFFICE VISIT (OUTPATIENT)
Dept: CARDIOLOGY | Facility: CLINIC | Age: 29
End: 2024-05-02
Payer: MEDICAID

## 2024-05-02 VITALS
BODY MASS INDEX: 29.4 KG/M2 | HEART RATE: 89 BPM | HEIGHT: 68 IN | SYSTOLIC BLOOD PRESSURE: 112 MMHG | WEIGHT: 194 LBS | OXYGEN SATURATION: 99 % | RESPIRATION RATE: 16 BRPM | DIASTOLIC BLOOD PRESSURE: 82 MMHG

## 2024-05-02 DIAGNOSIS — I48.0 PAROXYSMAL ATRIAL FIBRILLATION: ICD-10-CM

## 2024-05-02 DIAGNOSIS — E78.00 HYPERCHOLESTEROLEMIA: ICD-10-CM

## 2024-05-02 DIAGNOSIS — I10 PRIMARY HYPERTENSION: Primary | ICD-10-CM

## 2024-05-02 PROCEDURE — 3079F DIAST BP 80-89 MM HG: CPT | Performed by: INTERNAL MEDICINE

## 2024-05-02 PROCEDURE — 99214 OFFICE O/P EST MOD 30 MIN: CPT | Performed by: INTERNAL MEDICINE

## 2024-05-02 PROCEDURE — 3074F SYST BP LT 130 MM HG: CPT | Performed by: INTERNAL MEDICINE

## 2024-05-02 RX ORDER — METOPROLOL SUCCINATE 50 MG/1
50 TABLET, EXTENDED RELEASE ORAL DAILY
Qty: 90 TABLET | Refills: 3 | Status: SHIPPED | OUTPATIENT
Start: 2024-05-02

## 2024-05-02 NOTE — ASSESSMENT & PLAN NOTE
Triggered by severe dehydration probable electrolyte abnormalities.  Event monitor negative for recurrence of atrial fibrillation done at [facility]. Patient off Eliquis with no clinical recurrence.  Treadmill stress test negative.  Home sleep study is pending.  Plan:  Aggressive blood pressure control and lipid-lowering therapy  Follow-up clinically. Previously discussed with patient's getting an outpatient monitoring device of type Apple Watch or [employer] mobile versus a blood pressure monitor that will alert patient if has irregular heartbeat

## 2024-05-02 NOTE — TELEPHONE ENCOUNTER
----- Message from Nick VOGEL sent at 5/1/2024  8:53 PM EDT -----  Please inform patient that his treadmill Stress Test was normal. Thank you!

## 2024-05-02 NOTE — TELEPHONE ENCOUNTER
----- Message from Anisa NOLASCO sent at 5/1/2024  8:53 PM EDT -----  Please inform patient that his treadmill Stress Test was normal. Thank you!

## 2024-05-02 NOTE — TELEPHONE ENCOUNTER
Informed patient that his treadmill Stress Test was normal. PT verbalized understanding and had no further questions.

## 2024-05-02 NOTE — PROGRESS NOTES
"[facility]  [facility]  [address] [place] [address]  Dept: [phone]  Dept Fax: [phone]    Date: 05/02/2024  Patient: Faisal Dougherty  YOB: 1995    Follow Up Office Visit Note    Interval Follow-up  Mr. Faisal Dougherty is a 28 y.o. male who is here for follow-up on Atrial Fibrillation.    Subjective   Patient doing well with no complaints today.  Patient denies angina, orthopnea, PND, palpitations, lightheadedness, syncope or medications side-effects.  Taking metoprolol tartrate 25 mg 2 tabs in the evening.    The following portions of the patient's history were reviewed and updated as appropriate: allergies, current medications, past family history, past medical history, past social history, past surgical history, and problem list.    Medications: No Known Allergies   Current Outpatient Medications   Medication Instructions    atorvastatin (LIPITOR) 40 mg, Oral, Daily    buPROPion XL (WELLBUTRIN XL) 150 mg, Oral, Daily    Cariprazine HCl (VRAYLAR) 1.5 mg, Oral, Daily    famotidine (PEPCID) 20 mg, Oral, 2 Times Daily    lisinopril (PRINIVIL,ZESTRIL) 20 mg, Oral, Daily    metoprolol succinate XL (TOPROL-XL) 50 mg, Oral, Daily    sertraline (ZOLOFT) 25 mg, Oral, Daily    valACYclovir (VALTREX) 1,000 mg, Oral, Daily       Tobacco Use: Low Risk  (5/2/2024)    Patient History     Smoking Tobacco Use: Never     Smokeless Tobacco Use: Never     Passive Exposure: Never        Objective  Vitals:    05/02/24 1000   BP: 112/82   BP Location: Right arm   Patient Position: Sitting   Cuff Size: Adult   Pulse: 89   Resp: 16   SpO2: 99%   Weight: 88 kg (194 lb)   Height: 172.7 cm (68\")   PainSc: 0-No pain      Vitals:    05/02/24 1000   BP: 112/82   BP Location: Right arm   Patient Position: Sitting   Cuff Size: Adult   Pulse: 89   Resp: 16   SpO2: 99%   Weight: 88 kg (194 lb)   Height: 172.7 cm (68\")          Physical Exam  Constitutional:       Appearance: " "Healthy appearance. Not in distress.   Eyes:      Pupils: Pupils are equal, round, and reactive to light.   Neck:      Vascular: No JVR. JVD normal.   Pulmonary:      Effort: Pulmonary effort is normal.      Breath sounds: Normal breath sounds. No wheezing. No rhonchi. No rales.   Chest:      Chest wall: Not tender to palpatation.   Cardiovascular:      PMI at left midclavicular line. Normal rate. Regular rhythm. Normal S1 with normal intensity. Normal S2 with normal intensity.       Murmurs: There is no murmur.      No gallop.  No click. No rub.   Pulses:     Intact distal pulses.   Edema:     Peripheral edema absent.   Abdominal:      General: There is no abdominal bruit.   Skin:     General: Skin is warm.   Neurological:      Mental Status: Alert and oriented to person, place and time.              Diagnostic Data  Lab Results   Component Value Date     10/03/2023    K 4.4 10/03/2023     10/03/2023    CO2 24 10/03/2023    BUN 9 10/03/2023    CREATININE 0.87 10/03/2023    CALCIUM 9.0 10/03/2023    BILITOT 1.2 10/03/2023    ALKPHOS 89 10/03/2023    ALT 35 10/03/2023    AST 20 10/03/2023    GLUCOSE 98 10/03/2023    ALBUMIN 4.3 10/03/2023     Lab Results   Component Value Date    WBC 6.1 10/03/2023    HGB 14.8 10/03/2023    HCT 46.0 10/03/2023     10/03/2023     No results found for: \"APTT\", \"INR\", \"PTT\"  Lab Results   Component Value Date    CKTOTAL 126 10/03/2023    CKTOTAL 142 10/26/2022     No results found for: \"BNP\", \"PROBNP\"    Lab Results   Component Value Date    CHLPL 202 (H) 10/03/2023    TRIG 158 (H) 10/03/2023    HDL 34 (L) 10/03/2023     (H) 10/03/2023     Lab Results   Component Value Date    TSH 1.030 10/03/2023    FREET4 1.10 10/26/2022       CV Diagnostics:  Procedures    CXR: No results found for this or any previous visit.     ECHO/MUGA: No results found for this or any previous visit.     STRESS TESTS: Results for orders placed in visit on 05/01/24    Treadmill Stress " Test    Interpretation Summary    Findings consistent with a normal ECG stress test.    No ECG evidence of myocardial ischemia.    Negative clinical evidence of myocardial ischemia.     CARDIAC CATH: No results found for this or any previous visit.     DEVICES: No valid procedures specified.   HOLTER: No results found for this or any previous visit.     CT/MRI:  No results found for this or any previous visit.    VASCULAR: No valid procedures specified.     Assessment and Plan  Diagnoses and all orders for this visit:    1. Primary hypertension (Primary)  Assessment & Plan:  Hypertension is stable and controlled  Continue current treatment regimen.  Medication changes per orders.  Dietary sodium restriction.  Weight loss.  Regular aerobic exercise.  Ambulatory blood pressure monitoring.  Change metoprolol succinate ER 50 mg 1 tab daily in the evening, since patient likes once daily dosing only.  Instructed to take metoprolol tartrate 25 mg 1 tab twice daily while waiting for the pharmacy to fill his new medication.  Blood pressure will be reassessed in 1 year.      2. Hypercholesterolemia  Assessment & Plan:   Lipid abnormalities are stable.     Plan:  Continue same medication/s without change.  Atorvastatin 40 mg p.o. daily.     Discussed medication dosage, use, side effects, and goals of treatment in detail.    Counseled patient on lifestyle modifications to help control hyperlipidemia.   Cholesterol lowering dietary information shared with patient.  Advised patient to exercise for 150 minutes weekly. (30 minute brisk walk, 5 days a week for example)  Weight Loss encouraged  Patient counseled in regards to heart healthy, low fat/ low cholesterol/low sat diet, daily exercise for 30 minutes, low to moderate intensity, and weight loss.  Patient had some labs done at his primary care's office after starting the cholesterol pill, and we will retrieve them to assess if patient at goal.  Patient Treatment Goals:   LDL  goal is less than 70     Followup in 1 year.      3. Paroxysmal atrial fibrillation  Assessment & Plan:  Triggered by severe dehydration probable electrolyte abnormalities.  Event monitor negative for recurrence of atrial fibrillation done at [facility]. Patient off Eliquis with no clinical recurrence.  Treadmill stress test negative.  Home sleep study is pending.  Plan:  Aggressive blood pressure control and lipid-lowering therapy  Follow-up clinically. Previously discussed with patient's getting an outpatient monitoring device of type Apple Watch or [employer] mobile versus a blood pressure monitor that will alert patient if has irregular heartbeat      Other orders  -     metoprolol succinate XL (TOPROL-XL) 50 MG 24 hr tablet; Take 1 tablet by mouth Daily.  Dispense: 90 tablet; Refill: 3         Return in about 1 year (around 5/2/2025) for Next scheduled follow up, Follow-up with Dr Barnes.    There are no Patient Instructions on file for this visit.    Liam Barnes MD

## 2024-05-02 NOTE — ASSESSMENT & PLAN NOTE
Hypertension is stable and controlled  Continue current treatment regimen.  Medication changes per orders.  Dietary sodium restriction.  Weight loss.  Regular aerobic exercise.  Ambulatory blood pressure monitoring.  Change metoprolol succinate ER 50 mg 1 tab daily in the evening, since patient likes once daily dosing only.  Instructed to take metoprolol tartrate 25 mg 1 tab twice daily while waiting for the pharmacy to fill his new medication.  Blood pressure will be reassessed in 1 year.

## 2024-05-02 NOTE — ASSESSMENT & PLAN NOTE
Lipid abnormalities are stable.     Plan:  Continue same medication/s without change.  Atorvastatin 40 mg p.o. daily.     Discussed medication dosage, use, side effects, and goals of treatment in detail.    Counseled patient on lifestyle modifications to help control hyperlipidemia.   Cholesterol lowering dietary information shared with patient.  Advised patient to exercise for 150 minutes weekly. (30 minute brisk walk, 5 days a week for example)  Weight Loss encouraged  Patient counseled in regards to heart healthy, low fat/ low cholesterol/low sat diet, daily exercise for 30 minutes, low to moderate intensity, and weight loss.  Patient had some labs done at his primary care's office after starting the cholesterol pill, and we will retrieve them to assess if patient at goal.  Patient Treatment Goals:   LDL goal is less than 70     Followup in 1 year.

## 2024-05-07 ENCOUNTER — TELEPHONE (OUTPATIENT)
Dept: CARDIOLOGY | Facility: CLINIC | Age: 29
End: 2024-05-07
Payer: MEDICAID

## 2024-05-10 ENCOUNTER — OFFICE VISIT (OUTPATIENT)
Dept: FAMILY MEDICINE CLINIC | Facility: CLINIC | Age: 29
End: 2024-05-10
Payer: MEDICAID

## 2024-05-10 VITALS
HEART RATE: 80 BPM | HEIGHT: 68 IN | BODY MASS INDEX: 30.34 KG/M2 | DIASTOLIC BLOOD PRESSURE: 82 MMHG | SYSTOLIC BLOOD PRESSURE: 100 MMHG | OXYGEN SATURATION: 98 % | WEIGHT: 200.2 LBS

## 2024-05-10 DIAGNOSIS — E78.2 MIXED HYPERLIPIDEMIA: ICD-10-CM

## 2024-05-10 DIAGNOSIS — Z79.899 HIGH RISK MEDICATION USE: ICD-10-CM

## 2024-05-10 DIAGNOSIS — F31.9 BIPOLAR 1 DISORDER: ICD-10-CM

## 2024-05-10 DIAGNOSIS — I48.0 PAROXYSMAL ATRIAL FIBRILLATION: ICD-10-CM

## 2024-05-10 DIAGNOSIS — B00.9 HSV INFECTION: ICD-10-CM

## 2024-05-10 DIAGNOSIS — I10 PRIMARY HYPERTENSION: Primary | ICD-10-CM

## 2024-05-10 DIAGNOSIS — G47.30 SLEEP APNEA, UNSPECIFIED TYPE: ICD-10-CM

## 2024-05-10 PROBLEM — Z87.19 HISTORY OF REPAIR OF INGUINAL HERNIA: Status: ACTIVE | Noted: 2023-11-06

## 2024-05-10 PROBLEM — Z98.890 HISTORY OF REPAIR OF INGUINAL HERNIA: Status: ACTIVE | Noted: 2023-11-06

## 2024-05-10 PROCEDURE — 3074F SYST BP LT 130 MM HG: CPT | Performed by: FAMILY MEDICINE

## 2024-05-10 PROCEDURE — 1160F RVW MEDS BY RX/DR IN RCRD: CPT | Performed by: FAMILY MEDICINE

## 2024-05-10 PROCEDURE — 1126F AMNT PAIN NOTED NONE PRSNT: CPT | Performed by: FAMILY MEDICINE

## 2024-05-10 PROCEDURE — 1159F MED LIST DOCD IN RCRD: CPT | Performed by: FAMILY MEDICINE

## 2024-05-10 PROCEDURE — 99214 OFFICE O/P EST MOD 30 MIN: CPT | Performed by: FAMILY MEDICINE

## 2024-05-10 PROCEDURE — 3079F DIAST BP 80-89 MM HG: CPT | Performed by: FAMILY MEDICINE

## 2024-05-10 RX ORDER — VALACYCLOVIR HYDROCHLORIDE 1 G/1
1000 TABLET, FILM COATED ORAL DAILY
Qty: 90 TABLET | Refills: 3 | Status: SHIPPED | OUTPATIENT
Start: 2024-05-10

## 2024-05-10 RX ORDER — LISINOPRIL 20 MG/1
20 TABLET ORAL DAILY
Qty: 90 TABLET | Refills: 1 | Status: SHIPPED | OUTPATIENT
Start: 2024-05-10

## 2024-05-10 RX ORDER — BUPROPION HYDROCHLORIDE 150 MG/1
150 TABLET ORAL DAILY
Qty: 90 TABLET | Refills: 1 | Status: SHIPPED | OUTPATIENT
Start: 2024-05-10

## 2024-05-11 LAB
ALBUMIN SERPL-MCNC: 4.4 G/DL (ref 4.3–5.2)
ALBUMIN/GLOB SERPL: 1.6 {RATIO} (ref 1.2–2.2)
ALP SERPL-CCNC: 100 IU/L (ref 44–121)
ALT SERPL-CCNC: 46 IU/L (ref 0–44)
AST SERPL-CCNC: 20 IU/L (ref 0–40)
BASOPHILS # BLD AUTO: 0.1 X10E3/UL (ref 0–0.2)
BASOPHILS NFR BLD AUTO: 1 %
BILIRUB SERPL-MCNC: 1.1 MG/DL (ref 0–1.2)
BUN SERPL-MCNC: 12 MG/DL (ref 6–20)
BUN/CREAT SERPL: 12 (ref 9–20)
CALCIUM SERPL-MCNC: 9.4 MG/DL (ref 8.7–10.2)
CHLORIDE SERPL-SCNC: 101 MMOL/L (ref 96–106)
CHOLEST SERPL-MCNC: 154 MG/DL (ref 100–199)
CK SERPL-CCNC: 117 U/L (ref 49–439)
CO2 SERPL-SCNC: 24 MMOL/L (ref 20–29)
CREAT SERPL-MCNC: 0.98 MG/DL (ref 0.76–1.27)
EGFRCR SERPLBLD CKD-EPI 2021: 108 ML/MIN/1.73
EOSINOPHIL # BLD AUTO: 0.1 X10E3/UL (ref 0–0.4)
EOSINOPHIL NFR BLD AUTO: 1 %
ERYTHROCYTE [DISTWIDTH] IN BLOOD BY AUTOMATED COUNT: 12.5 % (ref 11.6–15.4)
GLOBULIN SER CALC-MCNC: 2.7 G/DL (ref 1.5–4.5)
GLUCOSE SERPL-MCNC: 100 MG/DL (ref 70–99)
HCT VFR BLD AUTO: 47.1 % (ref 37.5–51)
HDLC SERPL-MCNC: 33 MG/DL
HGB BLD-MCNC: 15.3 G/DL (ref 13–17.7)
IMM GRANULOCYTES # BLD AUTO: 0 X10E3/UL (ref 0–0.1)
IMM GRANULOCYTES NFR BLD AUTO: 0 %
LDLC SERPL CALC-MCNC: 80 MG/DL (ref 0–99)
LYMPHOCYTES # BLD AUTO: 3.1 X10E3/UL (ref 0.7–3.1)
LYMPHOCYTES NFR BLD AUTO: 37 %
MCH RBC QN AUTO: 27.5 PG (ref 26.6–33)
MCHC RBC AUTO-ENTMCNC: 32.5 G/DL (ref 31.5–35.7)
MCV RBC AUTO: 85 FL (ref 79–97)
MONOCYTES # BLD AUTO: 0.7 X10E3/UL (ref 0.1–0.9)
MONOCYTES NFR BLD AUTO: 9 %
NEUTROPHILS # BLD AUTO: 4.4 X10E3/UL (ref 1.4–7)
NEUTROPHILS NFR BLD AUTO: 52 %
PLATELET # BLD AUTO: 333 X10E3/UL (ref 150–450)
POTASSIUM SERPL-SCNC: 4.5 MMOL/L (ref 3.5–5.2)
PROT SERPL-MCNC: 7.1 G/DL (ref 6–8.5)
RBC # BLD AUTO: 5.56 X10E6/UL (ref 4.14–5.8)
SODIUM SERPL-SCNC: 139 MMOL/L (ref 134–144)
TRIGL SERPL-MCNC: 249 MG/DL (ref 0–149)
VLDLC SERPL CALC-MCNC: 41 MG/DL (ref 5–40)
WBC # BLD AUTO: 8.5 X10E3/UL (ref 3.4–10.8)